# Patient Record
Sex: FEMALE | Race: WHITE | URBAN - METROPOLITAN AREA
[De-identification: names, ages, dates, MRNs, and addresses within clinical notes are randomized per-mention and may not be internally consistent; named-entity substitution may affect disease eponyms.]

---

## 2018-09-13 ENCOUNTER — TELEPHONE (OUTPATIENT)
Dept: BEHAVIORAL HEALTH | Facility: CLINIC | Age: 48
End: 2018-09-13

## 2018-09-13 NOTE — TELEPHONE ENCOUNTER
----- Message from SHEEBA Campo sent at 9/13/2018  2:20 PM CDT -----  Regarding: Set up CD Assessment for cleint at [facility]  Please arrange to set up a CD assessment for the above client at the [facility] location for Thursday, September 20. I believe there are open appointments available.     Thanks    SHEEBA Salinas

## 2018-09-14 ENCOUNTER — TRANSFERRED RECORDS (OUTPATIENT)
Dept: HEALTH INFORMATION MANAGEMENT | Facility: CLINIC | Age: 48
End: 2018-09-14

## 2018-09-14 NOTE — TELEPHONE ENCOUNTER
S-R25 and MARIMAR rec'd from [facility] [employer] in [place].  B-Sent by Mirza Tamayo ()  A-Faxed to [facility] eval team.  - eval is scheduled on 9/27 at 12 pm.

## 2018-09-14 NOTE — TELEPHONE ENCOUNTER
Vincenzo called from [facility] to refer patient for IOP.  Per SD's notes below eval is scheduled for the 9/27th.  Pt is being discharged today or tomorrow.  She will fax over

## 2018-09-14 NOTE — TELEPHONE ENCOUNTER
----- Message from SHEEBA Campo sent at 9/13/2018  3:47 PM CDT -----  Regarding: RE: Set up CD Assessment for cleint at [facility]  Let's go with the 27th at Noon.    Thanks    Tres  ----- Message -----     From: Krys Arizmendi     Sent: 9/13/2018   3:00 PM       To: SHEEBA Campo  Subject: RE: Set up CD Assessment for cleint at [facility]#    Sawyer Joshua,  The next appt at [place] is on 9/27 at noon.   After that, on 10/1 at 10a, 12p, 5p.  Let me know which appt you would like for [facility].  Krys    ----- Message -----     From: Tres Briceno LADC     Sent: 9/13/2018   2:20 PM       To: SHEEBA Hunter, Lo Restrepo, #  Subject: Set up CD Assessment for cleint at [facility]#    Please arrange to set up a CD assessment for the above client at the [facility] location for Thursday, September 20. I believe there are open appointments available.     Thanks    SHEEBA Salinas

## 2018-09-17 NOTE — TELEPHONE ENCOUNTER
Pt called with her fiance Kenan Burciaga and gave verbal permission to exchange info over the recorded line.  His phone is [phone].    They were checking on if they could schedule something sooner. Bit of confusion or miscommunication re: what insurance they had if and scheduling something sooner for her.   They decided to keep the appt. florinda

## 2018-09-19 NOTE — TELEPHONE ENCOUNTER
In speaking with SO, we will keep appointment for client for assessment on 9/27. Client is willing now apparently to entertain thought of OP vs. IP treatment. Asked for copy of CD assessment (or whatever paperwork is available) from [facility] from weekend of 9/14 in preparation for 9/27 appointment.   SHEEBA Salinas

## 2018-09-27 NOTE — TELEPHONE ENCOUNTER
----- Message from SHEEBA Campo sent at 9/25/2018  7:25 PM CDT -----  Regarding: Could I get copy of CD assessment from [facility] for client  Krys: Per the note you entered for the above client in EPIC on 9/13:     S-R25 and MARIMAR rec'd from [employer], [facility] in [place].  B-Sent by Mirza Tamayo ()  A-Faxed to [place] CD eval team.  R-CD eval is scheduled on 9/27 at 12 pm.    Could I please get a copy of the R25 and MARIMAR you got from [facility] [employer]? We can't seem to find it here at the [place] location.  Fax is [phone].    Thanks very much.     SHEEBA Salinas

## 2018-09-27 NOTE — TELEPHONE ENCOUNTER
----- Message from Lo Restreop sent at 9/27/2018 12:45 PM CDT -----  Regarding: cancelling appt   Please cancel appt for eval today at noon.     Pt's significant other called to cancel her appt for an eval today. He called a little before the appt stating at this time they will not be rescheduling.     Thank you!

## 2018-10-15 NOTE — TELEPHONE ENCOUNTER
Received call from pt's friend with pt in the background.  Requested CD eval. Previous eval on 9/27 was cancelled.    10/22 at 10:00am at [facility] sent

## 2018-10-15 NOTE — TELEPHONE ENCOUNTER
Received call from pt'keenan josue.  He reported they received a message from Tres at [place] regarding an open appt at [place] on 10/16.  Cancelled appt on 10/22 and scheduled for 10/16 at [facility].

## 2018-10-16 ENCOUNTER — HOSPITAL ENCOUNTER (OUTPATIENT)
Dept: BEHAVIORAL HEALTH | Facility: CLINIC | Age: 48
Discharge: HOME | End: 2018-10-16
Attending: SOCIAL WORKER | Admitting: SOCIAL WORKER
Payer: COMMERCIAL

## 2018-10-16 PROCEDURE — H0001 ALCOHOL AND/OR DRUG ASSESS: HCPCS

## 2018-10-16 ASSESSMENT — ANXIETY QUESTIONNAIRES
2. NOT BEING ABLE TO STOP OR CONTROL WORRYING: NEARLY EVERY DAY
6. BECOMING EASILY ANNOYED OR IRRITABLE: SEVERAL DAYS
5. BEING SO RESTLESS THAT IT IS HARD TO SIT STILL: NEARLY EVERY DAY
7. FEELING AFRAID AS IF SOMETHING AWFUL MIGHT HAPPEN: NEARLY EVERY DAY
GAD7 TOTAL SCORE: 19
3. WORRYING TOO MUCH ABOUT DIFFERENT THINGS: NEARLY EVERY DAY
1. FEELING NERVOUS, ANXIOUS, OR ON EDGE: NEARLY EVERY DAY
4. TROUBLE RELAXING: NEARLY EVERY DAY

## 2018-10-16 ASSESSMENT — PAIN SCALES - GENERAL: PAINLEVEL: NO PAIN (0)
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
## 2018-10-17 ASSESSMENT — ANXIETY QUESTIONNAIRES: GAD7 TOTAL SCORE: 19

## 2018-10-17 ASSESSMENT — PATIENT HEALTH QUESTIONNAIRE - PHQ9: SUM OF ALL RESPONSES TO PHQ QUESTIONS 1-9: 18
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
## 2018-10-17 NOTE — PROGRESS NOTES
Received copy of Rule 25 completed on 9/14/18 from [employer].  Full Rule 25 was not completed this day, and copy will be scanned in EMR.  Dictation reflects update and current risk and clinical information.    Alcohol / Drug Use Disorder Diagnostic Criteria    A. A problematic pattern of alcohol/drug use leading to clinically significant impairment or distress, as manifested by at least two of the following, occurring within a 12-month period:    1. ___x___  Alcohol/drug is often taken in larger amounts or over a longer period than was intended.    2. __x____  There is a persistent desire or unsuccessful efforts to cut down or control alcohol/drug use.    3. ___x___  A great deal of time is spent in activities necessary to obtain alcohol, use alcohol, or recover from its effects.    4. ___x___  Craving, or a strong desire or urge to use alcohol/drug.    5. __x____  Recurrent alcohol/drug use resulting in a failure to fulfill major role obligations at work, school, or home.    6.  __x____ Continued alcohol use despite having persistent or recurrent social or interpersonal problems caused or exacerbated by the effects of alcohol/drug.    7. __x____  Important social, occupational, or recreational activities are given up or reduced because of alcohol/drug use.    8.  ___x___ Recurrent alcohol/drug use in situations in which it is physically hazardous.    9. ___x___  Alcohol/drug use is continued despite knowledge of having a persistent or recurrent physical or psychological problem that is likely to have been caused or exacerbated by alcohol.    10. Tolerance, as defined by either of the following:    a.  ___x___ A need for markedly increased amounts of alcohol/drug l to achieve intoxication or desired effect.    b.  ___x___ A markedly diminished effect with continued use of the same amount of alcohol/drug .    11. Withdrawal, as manifested by either of the following:    a.  __x____ The characteristic withdrawal  syndrome for alcohol/drug (refer to Criteria A and B of the criteria set for alcohol/drug withdrawal).    b.  __x____ Drug/alcohol (or a closely related substance, such as a benzodiazepine) is taken to relieve or avoid withdrawal symptoms.    Specify if:  In early remission:    After full criteria for alcohol/drug use disorder were previously met, none of the criteria for alcohol/drug use disorder have been met for at least 3 months but for less than 12 months (with the exception that Criterion A4,  Craving, or a strong desire or urge to use alcohol/drug,  may be met).    In sustained remission:  After full criteria for alcohol use disorder were previously met, none of the criteria for alcohol/drug use disorder have been met at any time during a period of 12 months or longer (with the exception that Criterion A4,  Craving, or a strong desire or urge to use alcohol/drug,  may be met).     Specify if:  In a controlled environment:   This additional specifier is used if the individual is in an environment where access to alcohol is restricted.     Moderate:  Presence of 4-5 symptoms.  Severe:  Presence of 6 or more symptoms

## 2018-10-26 ENCOUNTER — APPOINTMENT (OUTPATIENT)
Dept: FAMILY MEDICINE | Facility: CLINIC | Age: 48
End: 2018-10-26

## 2018-10-26 PROCEDURE — 99499 UNLISTED E&M SERVICE: CPT | Performed by: FAMILY MEDICINE

## 2018-11-16 ENCOUNTER — TRANSFERRED RECORDS (OUTPATIENT)
Dept: HEALTH INFORMATION MANAGEMENT | Facility: CLINIC | Age: 48
End: 2018-11-16

## 2018-11-21 ENCOUNTER — TELEPHONE (OUTPATIENT)
Dept: BEHAVIORAL HEALTH | Facility: CLINIC | Age: 48
End: 2018-11-21

## 2018-11-21 NOTE — TELEPHONE ENCOUNTER
----- Message from SHEEBA Campo sent at 11/20/2018  5:42 PM CST -----  Regarding: Set up 1:1 appointment and IOP CD Phase I appointments at [facility]  Please arrange to schedule a 1:1 appointment with me for the above client at the [facility] location on Tuesday, November 27 at 4:30PM. My provider ID is; [id number].     Also please arrange to set up 26 mixed IOP CD Phase I appointments for the above client at the [facility] location beginning Tuesday, November 27 at 5:30PM. [id number].    Thanks     SHEEBA Salinas

## 2018-11-27 ENCOUNTER — HOSPITAL ENCOUNTER (OUTPATIENT)
Dept: BEHAVIORAL HEALTH | Facility: CLINIC | Age: 48
End: 2018-11-27
Attending: SOCIAL WORKER
Payer: COMMERCIAL

## 2018-11-27 PROCEDURE — H2035 A/D TX PROGRAM, PER HOUR: HCPCS

## 2018-11-27 PROCEDURE — H2035 A/D TX PROGRAM, PER HOUR: HCPCS | Mod: HQ

## 2018-11-28 PROBLEM — F19.10 SUBSTANCE ABUSE (H): Status: ACTIVE | Noted: 2018-11-28

## 2018-11-30 NOTE — TELEPHONE ENCOUNTER
----- Message from Crystal Roy, LICSW sent at 11/29/2018  9:16 PM CST -----  Regarding: schedule individual session   Please schedule Stephany for an individual session with Crystal Roy, #[id number], on Monday, 12/03/18, at 4:30 PM.    Thanks,  Crystal

## 2018-12-03 ENCOUNTER — HOSPITAL ENCOUNTER (OUTPATIENT)
Dept: BEHAVIORAL HEALTH | Facility: CLINIC | Age: 48
End: 2018-12-03
Attending: SOCIAL WORKER
Payer: COMMERCIAL

## 2018-12-03 PROCEDURE — H2035 A/D TX PROGRAM, PER HOUR: HCPCS | Mod: HQ

## 2018-12-03 PROCEDURE — H2035 A/D TX PROGRAM, PER HOUR: HCPCS
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
## 2018-12-04 NOTE — PROGRESS NOTES
"Stephany AZAR Mohs  December 3, 2018  [id number]    [facility] Mental Health Process Group Note      Day and Date:  Monday, 12/03/18    Number of participants: 2     Length of Group:  3 hours    Goal of the Group: Clients learn key concepts of traditional CBT (cognitive distortions, conditioning, automatic thoughts, reframing) and then build on these by learning three core concepts of third wave therapies (ACT, DBT, MB): Acceptance, Cognitive Defusion and Being Present. The objective is to increase psychological flexibility and choose behaviors that serve the clients  personal values.      Rating scales are 1-10, with 1 being low and 10 being high or most severe.     [place]: Group Topics and Activities     I.  D3 Intervention and Group Topic addressed: Part 1:  3 Core processes to increase psychological flexibility and increase resiliency for mental and physical health.     II. Mindfulness and/or Motivational Component: Getting Comfortable with Moods, Exploring Affirmations to use this week, Liquid Mood meditation, Awareness of Sounds     III. Additional Activity:  Three Simple Steps to Acceptance; Using Affirmations to increase Self-Acceptance; Cognitive Defusion exercises     IV. Review of Progress:   Client's self-report: This was Stephany's first group with this therapist, but we had met for an individual session before this group, see separate documentation for that session.  She reported having a bad toothache, maybe abscessed, but stuck with the program.  She said her pain was a \"10.\" She said her stress was a 7 earlier this week when she got out of a 32 day treatment and she felt somewhat disoriented and not sure of how to appy the skills she learned at home and at work.  Currently her stress is a 2, but the pain of her tooth makes her feel \"irritable more than anything.\" The color of her liquid-mood changed from red to teal to purple.      A.  Therapist's Assessment: Stephany participated fully in group and stayed the " "whole time even with a bad toothache.  Her favorite self-acceptance affirmation was \"I'm not a bad person when I act badly; I am a person who has acted badly.\"  She appeared very engaged in the discussion of cognitive defusion and how to detach a bit from thoughts and emotions to build distress tolerance.     V.   Reflection and evaluation of today s group experience:  Gave verbal feedback during group and completed the evaluation. Comments included: The most important thing(s) learned today was \"Staying in the present, breathing technique to help bring my thoughts back I start overthinking\" and she was surprised to learn \"The science in how the mind can work and how you can change the way you're thinking.\"      VI. Assignments or activities to do for next week: Complete the journal and practice mindfulness daily. Try to use cognitive defusion throughout the week on negative thoughts and emotions.    Therapeutic techniques in this session:  Motivational Therapy, CBT/DBT/ACT, Supportive and Mindfulness    Additional Treatment Referrals/Follow-up Issues to Address: Not indicated at this time.    Change in Treatment Plan: Not indicated at this time. This group completes one of the Tx Plan interventions under D3.      Change in Diagnosis: No changes at this time.    "

## 2018-12-10 ENCOUNTER — HOSPITAL ENCOUNTER (OUTPATIENT)
Dept: BEHAVIORAL HEALTH | Facility: CLINIC | Age: 48
End: 2018-12-10
Attending: SOCIAL WORKER
Payer: COMMERCIAL

## 2018-12-10 PROCEDURE — H2035 A/D TX PROGRAM, PER HOUR: HCPCS | Mod: HQ

## 2018-12-11 ENCOUNTER — HOSPITAL ENCOUNTER (OUTPATIENT)
Dept: BEHAVIORAL HEALTH | Facility: CLINIC | Age: 48
End: 2018-12-11
Attending: SOCIAL WORKER
Payer: COMMERCIAL

## 2018-12-11 PROCEDURE — H2035 A/D TX PROGRAM, PER HOUR: HCPCS | Mod: HQ

## 2018-12-11 NOTE — PROGRESS NOTES
"Stephany AZAR Mohs  December 10, 2018  [id number]    [facility] Mental Health Process Group Note      Day and Date: Monday, 12/10/18     Number of participants:  4     Length of Group: 3 hours    Goal of the Group: Clients learn key concepts of traditional CBT (cognitive distortions, conditioning, automatic thoughts, reframing) and then build on these by learning three core concepts of third wave therapies (ACT, DBT, MB): Self-as-Context, Values and Committed Action. The objective is to increase psychological flexibility and choose behaviors that serve the clients  personal values.        Rating scales are 1-10, with 1 being low and 10 being high or most severe.     [place]: Group Topics and Activities     I.  D3 Intervention and Group Topic addressed: Mental Health Part 2    II. Mindfulness and/or Motivational Component:  Values and Committed Action, Psychological Flexibility, Excerpts from documentary  I AM  to clarify values and emphasize the importance of community and connection to others.      III. Additional Activity:  Completed examples of  auto-  behavior vs. values based actions, completed the AAQ-II scale; identified personal value to work on this week +  create a  goal that reflects this value + commit to one step toward that goal for this week.       IV. Review of Progress:   A. Client's self-report: Stephany reported that she had a good week and she and her ex-fiance got \"re-engaged.\" She got treatment for her abscessed tooth last week and is feeling better now.  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  He did not do her journal as she \"forgot about it\" but will this week. She did deep breathing this week. Her stress is a 3 and she has had no cravings, just thoughts/memories.      B. Therapist's Assessment:  Stephany shared her experiences " "in recovery with the group. She remains engaged throughout and said she felt uplifted by today's topic. The value Stephany chose to work on this week is \"[employer]\" with regard to \"following through.\"  The goal that reflects this value is \"To find a career path that better fits my current life.  The step toward this goal for this week is \"Get on the Internet and do a job search.\"     V.   Reflection and evaluation of today s group experience:  Gave verbal feedback and filled out evaluation form. The most important thing learned today was \"That my thoughts step from my heart and then my brain\" [refers to documentary] and something that surprised her was \"the way that science and spirituality can work together.\"      VI. Assignments or activities to do for next week: Continue to do the journal and mindfulness, practice cognitive defusion, recognize the \"observing self\" and notice what values are reflected in daily choices and decisions.  Follow through on your STEP 1 from today s value s work.     Therapeutic techniques in this session:  Motivational Therapy, CBT/DBT/ACT, Supportive, Behavioral Modification and Mindfulness     Additional Treatment Referrals/Follow-up Issues to Address: Not indicated at this time.      Change in Treatment Plan: Not indicated at this time. This session completes one Tx Plan intervention under D3.       Change in Diagnosis: Not at this time.        "

## 2018-12-17 ENCOUNTER — HOSPITAL ENCOUNTER (OUTPATIENT)
Dept: BEHAVIORAL HEALTH | Facility: CLINIC | Age: 48
End: 2018-12-17
Attending: SOCIAL WORKER
Payer: COMMERCIAL

## 2018-12-17 PROCEDURE — H2035 A/D TX PROGRAM, PER HOUR: HCPCS | Mod: HQ

## 2018-12-17 PROCEDURE — H2035 A/D TX PROGRAM, PER HOUR: HCPCS
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
## 2018-12-17 NOTE — TELEPHONE ENCOUNTER
----- Message from Crystal Roy LICSW sent at 12/14/2018  7:20 PM CST -----  Regarding: schedule individual session   Please scheduled Stephany for an individual session with Crystal Roy (#[id number]) for Monday, 12/17/18, at 3:30 PM.      Thank you,  Crystal

## 2018-12-18 ENCOUNTER — BEH TREATMENT PLAN (OUTPATIENT)
Dept: BEHAVIORAL HEALTH | Facility: CLINIC | Age: 48
End: 2018-12-18

## 2018-12-18 ENCOUNTER — HOSPITAL ENCOUNTER (OUTPATIENT)
Dept: BEHAVIORAL HEALTH | Facility: CLINIC | Age: 48
End: 2018-12-18
Attending: SOCIAL WORKER
Payer: COMMERCIAL

## 2018-12-18 PROCEDURE — H2035 A/D TX PROGRAM, PER HOUR: HCPCS | Mod: HQ

## 2018-12-18 NOTE — TREATMENT PLAN
[facility]  Adult Chemical Dependency Program  Treatment Plan Requirements    These services are provided by the facility for each patient/client according to the individual's treatment plan:    Individual and group counseling    Education    Transition services    Services to address any co-occurring mental illness    Service coordination    Initial Treatment Plan Goals:  1. Complete all the requirements of Program Orientation.  2. Maintain medication compliance throughout the program.  3. Complete requirements for workshop/skills groups based on identified issues on your problem list.  4. Complete the support group attendance feedback sheet weekly.  5. Gain family involvement in treatment process to address family issues from the problem list.  6. Attend and participate in all required groups per individual treatment plan.  7. Focus attention to individualized issues from the treatment plan.  8. Complete all requirements for UA's, alcohol screening tests and other testing.  9. Schedule a physical examination if recommended.    In addition to the above, complete all individual goals as specifically outlines on your treatment plan.    Criteria for discharge:  Patients/clients are discharged from the program following completion of the entire program including Phase I and II or acceptance of other post-treatment referrals such as [facility] house, or aftercare at other facilities.  Patients/clients may also be discharged for inappropriate behavior or chemical use.      Favorable Discharge - Patients/clients have completed agreed upon treatment goals, understand their diagnosis and appear motivated about the follow-up care.    Guarded Discharge - Patients/clients have demonstrated some understanding of their diagnosis and recovery process, and have completed some of their treatment goals.  This prognosis also includes patients/clients who have completed some treatment goals but have not made  commitment to community support or follow through with referrals.    Unfavorable Discharge - Patients/clients have not completed agreed upon treatment goals due to their own choice, have limited understanding of their diagnosis, and have shown minimal or inconsistent behavior conducive to recovery.  Those patients/clients discharged due to behavioral problems will also be unfavorable discharges.    Adult CD Treatment Plan                                Stephany AZAR Mohs   [id number]   1970 48 year old female    Acute Intoxication/Withdrawal Potential     DIMENSION 1  RISK FACTOR: 0     SUBSTANCE USE DISORDERS:  Alcohol            Date Assigned Source Area of Treatment Focus / Goal / Treatment Strategies    Target  Date Initials Outcome Date Completed   December 18, 2018   Self -  Current, History -  Current and Collateral -  Current  Area of Treatment Focus:   Substance use, cravings and urges.  Last use date was reported as 10/22/2018.       Goal:   Develop effective strategies to maintain sobriety.      Treatment Strategies:   Report to counselor and group any alcohol or drug use. April 30, 2019 KRN Effective and Refused     Sober date reported Client drank 3/24, no call no show appointments in April.     Client passed several BA's in CD tx.  May 20, 2019       Biomedical Conditions and Complaints     DIMENSION 2  RISK FACTOR: 1             Date Assigned Source Area of Treatment Focus / Goal / Treatment Strategies Target  Date Initials Outcome Date Completed   December 18, 2018   Self -  Current, History -  Current and Collateral -  Current  Area of Treatment Focus:  Client reports history of hypertension and thyroid issues.  From EMR:     Goal:   Follow recommendations of medical provider.    Treatment Strategies:  D Report date of scheduled appointment to counselor, Report change in severity of symptoms to staff.  and Continue to take prescribed medications and follow-up with medical interventions while in  program. Report regularly on any new concerns regarding physical health and recommended interventions.  April 30, 2019 NABIL Effective     [facility] drug overdose stats 12/7/      May 20, 2019       December 18, 2018   Self -  Current and Assessment -  Current  Problem: Lack of regular program of physical activity.   Goal: Establish regular exercise program while in [facility] CD treatment.   Intervention: Report during regular check in regarding regular physical activity.  1/15/2019 SONYAN Effective and Refused     No updates.  May 20, 2019       Emotional/Behavioral/Cognitive Conditions and Complications     DIMENSION 3  RISK FACTOR: 2             Date Assigned Source Area of Treatment Focus / Goal / Treatment Strategies Target  Date Initials Outcome Date Completed     December 18, 2018   Self -  Current and Assessment -  Current  Area of Treatment Focus:   Diagnosis of anxiety. .  Client reports low self esteem.     Goal:   Improve self-esteem. and Understand the relationship between addiction and mental health issues.    Treatment Strategies:    Identify 5 coping skills to reduce anxiety.  Practice techniques daily and when needed., List 5 ways your addiction has impacted your mental health.  and establish regular 1:1 appointments with [facility] psychotherapist to address emotionale needs, regulation and coping skills.  Report during weekly check in the effectiveness or challenges to effective interventions.     1. Attend psychotherapy session with Crystal Roy to explore needs and assess safety.     2. Complete a diagnostic assessment with Crystal Roy.     3. First individual psychotherapy session. Allision wants to work on the following issues:   How to help my mind not be so compulsive.   We are also to explore her fear of failure limiting her behavior and ability to explore options.      4. Met with Crystal for individual therapy session: Acceptance, stress relief, cognitive distortions, explore automatic negative thoughts she  identified.     5. Met with Crystal for individual therapy session to review progress, revisit her mindfulness and journal practice, problem solve, discuss relationships.    6. Met with Crystal for individual therapy, processed through recent grief and loss. Reported use episode after death of close friend.     7. Met with Crystal for psychotherapy, rated anxiety at 8, working on decreasing anxiety by organization skills to break procrastination, reviewed mindfulness and problem-solving.     8. Met with Crystal for psychotherapy: PHQ-9 = 8 (mild depression);   HAFSA-7 = 10 (mild-moderate anxiety) Session focused on ruminating thoughts, cognitive defusion and bringing self back to present.     9. Allison called in for this appointment and was excused.  1/15/2019 NABIL LAMA      SK    SK           SK       SK      DAINA LAMA        SK   Effective                               Effective      Effective    Effective           Effective      Effective      Effective        Effective        Effective         [Excused]                                12/03/18 12/17/18 01/21/19 02/11/19 02/25/19 03/25/19 04/08/19 04/22/19        [05/06/19]      December 18, 2018   Self -  Current and Assessment -  Current  Area of Treatment Focus:  Client lacks understanding of addiction as a disease and how this disorder affects other aspects of mental and physical health.    Goal:   Learn how to apply self-care and psychotherapy to build a repertoire of daily habits that counteract PAWS, fatigue, depression and anxiety leading to increased resiliency and well-being.     Treatment Strategies:  Attend the following 3-hour groups:    > Neurobiology of addiction: Informs client of brain changes and reduces feelings of shame, instructs on steps to help heal    > Learn and use Mindfulness to facilitate effective action in problem solving and promote health and well-being     >  "Mental Health Part 1: Learn 3 core processes of Acceptance,   Cognitive De-fusion, and Being Present    > Mental Health Part 2: Learn 3 core processes of Self-as-Context, Values, and Committed Action     > Learn and apply Self-Care in a variety of areas to improve mental and physical health, reduce PAWS, and increase feelings of self-empowerment, resiliency and well-being    > Learn Stress Management techniques to reduce PAWS and stress-related symptoms, increase resiliency, and learn healthy routines to replace dysfunctional habits      [Inserted Holiday Group on 12/31/18 due to this being a \"trigger\" day for people with addiction] Due: 2/19/19  Attend each group one time by above date, unless excused by primary counselor.    All group work to be completed each session to receive an effective outcome.  DAINA LAMA Effective                           Effective      Effective       Effective      Effective      Effective        [Excused Holiday]  Effective       [Excused]                           01/14/19      01/07/19       12/03/18      12/10/18      12/17/18        [12/24/18]    02/04/19       [12/31/18]     Readiness to Change     DIMENSION 4  RISK FACTOR: 1             Date Assigned Source Area of Treatment Focus / Goal / Treatment Strategies Target  Date Initials Outcome Date Completed   December 18, 2018   Self -  Current and Assessment -  Current  Area of Treatment Focus:  {Move up stages of change in recovery. Increase internal motivation for recovery/sobriety. Client reports her alcohol use affected relationships, finances and her work.     Goal:   Understand the impact your substance use has had on you., Understand the impact your substance use has had on your family and significant relationships. and Increase internal motivation.    Treatment Strategies:   Present using history, consequences of use and 5 values violated. and " "Obtain and prepare homework regarding motivation for change and reducing ambivalence about substance use disorder. Move up stages of change from contemplation to at least preparation. Report on values violated and actions taken to correct that situation while presenting HW assignments as given in group. Participate and offer feedback regularly in group work designed to enhance relationship building and internal motivation for recovery/sobriety.  3/1/2019 SONYAN Changed     \"One or two things I find easier/harder in sobriety/recovery than I thought\" 11/27    \"Alternatives to addictive lifestyle\" HW 1/8    \"First 90 days in recovery\" game plan 1/22 May 20, 2019          Relapse/Continues Use/Continues Problem Potential     DIMENSION 5  RISK FACTOR: 4                 Date Assigned Source Area of Treatment Focus / Goal / Treatment Strategies Target  Date Initials Outcome Date Completed   December 18, 2018   Self -  Current and Collateral -  Current  Area of Treatment Focus:   Lacks a daily routine that includes healthy and sober living skills.       Goal:   Develop sober coping and living skills.    Treatment Strategies:   Develop a relapse prevention plan including lifestyle changes, recovery activities, daily structure, self-care, support systems, spirituality, work, finances, recreation and crisis management. Prepare and present Recovery Care assignment in [facility] Phase III.  Practice and report on Recovery Program skill implementation and outcomes on regular basis. Adjust as necessary.  4/1/2019 SONYAN Refused     Stages of recovery 12/7    Did not complete RCA, did not attend sober support while in [facility] tx.  May 20, 2019       December 18, 2018   Self -  Current and History -  Current  Problem: History of relapse and self-sabotage.  and Client/Patient does not recognize relapse triggers and warning signs.     Goal: Identify personal triggers and relapse warning signs., Identify what led to your last relapse. and " "Identify and understand personal relapse and self-sabotage patterns.  Intervention: Complete the triggers and cravings assignment and include alternative behaviors., Describe your last relapse and what led to it including feelings and situations. and Identify and begin attending sober support groups. Participate in at least two relapse cycle overviews/group discussions while in IOP CD tx. Attend and report back on regular basis outcomes from attending sober support groups. Solicit feedback on regular basis from peers regarding barriers to attendance in sober support.  2/1/2019 NABIL Effective     AA overview/Q&A with AA member  12/11     \"Anish to Self\" video/discussion - science of safety applied to relapse. 12/18 May 20, 2019       Recovery Environment     DIMENSION 6  RISK FACTOR: 3                 Date Assigned Source Area of Treatment Focus / Goal / Treatment Strategies Target  Date Initials Outcome Date Completed   December 18, 2018   Self -  Current and Assessment -  Current  Area of Treatment Focus:   Lacks sober support network.    and Client/Patient lacks sober leisure activities.       Goal:   Develop a sober support network., Develop boundaries. and Reflect on treatment experience, celebrate accomplishments, anticipate challenges in new sober lifesytle and set goals.    Treatment Strategies:   Complete the personal \"Recovery Care Assignment\" and List 10 situations, people, emotional responses that are unhealthy.  Develop a plan to avoid or manage them.  Report regularly on people, places and things that are continuing to be challenges in avoiding trigger situations and what questions you have about those situations/triggers/cravings.  4/15/2019 NABIL Refused     Sober support group overview - 12 step and non 12 step (1/15).     \"Sobriety vs. Recovery\" dry drunk syndrome 2/12 May 20, 2019       December 18, 2018   Self -  Current and Assessment -  Current  Problem: Client reports need to increase income, " perhaps leading to job and/or career change.   Goal: Seek out employment that meets financial, family and JASPAL recovery needs.   Intervention: Report on status of career/job change during weekly check in. Report weekly on status of interview/job offers, etc. Work with counselor to adjust attendance in [facility] CD tx. As agreed upon in orientation session.  1/1/2019 KRN Effective     Client has obtained new job while in [facility] Phase I and Phase II programming.     Attendance has been adjusted with understaning that client would remain sober, remain employed, attend all Psychoeducation sessions and 1:1 therapist appointments regularly.  May 20, 2019         Individual abuse prevention plan (required for lodging plus) : specific actions, referral:   No additional protection measures required other than the Program Abuse Prevention Plan - No     All interventions that are designated as  current  will need to be completed in order to transition out of treatment with a favorable prognosis.  The treatment plan is a flexible document and a work in progress.  Interventions and goals may be added at any time to customize plan to each individual s needs.  Client may work with therapist to change interventions as long as they pertain to the goals stipulated in the plan and/or are clinically driven.     Acknowledgement of Current Treatment Plan - Initial Treatment Plan     INITIAL TREATMENT PLAN:     1. I have participated in creating my treatment plan with my therapist / counselor on March 25, 2019  , 2018  .     I agree with the plan as it is written in the electronic health record.    Name Signature/Date   Mohs, Allison G.    Name of Therapist / Counselor Signature/Date   SHEEBA Salinas              2. I have completed and reviewed my Safety Plan with my counselor and signed this on _December 18, 2018  _. I have been given the hard copy of this plan.    Patient signature/date:       _____________________________________________________________________________            3. Last Use Date: __________    Patient signature/date:     _____________________________________________________________________________

## 2018-12-18 NOTE — PROGRESS NOTES
Stephany came for her Diagnostic Assessment (DA) from 3:35PM - 5:05 PM, please see document for details.

## 2018-12-18 NOTE — TREATMENT PLAN
Patient Safety Plan Template    Name:   Allison G Mohs YOB: 1970 Age:  48 year old MR Number:  [id number]   Step 1: Warning signs (Thoughts, images, mood, situation, behavior) that a crisis may be developin. {Wildcard & [id number]}     2. {Wildcard & [id number]}     3. {Wildcard & NA:942314}     Step 2: Internal coping strategies - Things I can do to take my mind off of my problems without contacting another person (relaxation technique, physical activity):     1. {Wildcard & NA:044267}     2. {Wildcard & NA:478799}     3. {Wildcard & NA:465784}     Step 3: People and social settings that provide distraction:     1. Name: {Wildcard & NA:413489}   Phone: {Wildcard & NA:077802}   2. Name: {Wildcard & NA:731839}   Phone: {Wildcard & NA:326704}   3. Place: {Wildcard & NA:328010}   4. Place: {Wildcard & NA:418929}     The one thing that is most important to me and worth living for is: ***     Step 4: People whom I can ask for help:     1. Name: {Wildcard & NA:104124}   Phone: {Wildcard & NA:273849}     2. Name: {Wildcard & NA:703350}   Phone: {Wildcard & NA:431822}     3. Name: {Wildcard & NA:524198}   Phone: {Wildcard & NA:518459}     Step 5: Professionals or agencies I can contact during a crisis:     1. Clinician Name: {Wildcard & NA:095325}   Phone: {Wildcard & NA:793011}   Clinician Pager or Emergency Contact #: {Wildcard & NA:589572}     2. Clinician Name: {Wildcard & NA:147555}   Phone: {Wildcard & NA:994864}     Clinician Pager or Emergency Contact #: {Wildcard & NA:488336}     3. Local Urgent Care Services: {Wildcard & NA:743734}    Urgent Care Services Address: {Wildcard & NA:854046}    Urgent Care Services Phone: {Wildcard & NA:893666}     4. Suicide Prevention Lifeline Phone: 5-291-544-PSPN ([phone])     Step 6: Making the environment safe:     1. {Wildcard & NA:352997}     2. {Wildcard & NA:767064}     Safety Plan Template 2008 Laurence Chance and Stanford Campos is reprinted with the  express permission of the authors.  No portion of the Safety Plan Template may be reproduced without the express, written permission.  You can contact the authors at [email or web address] or [email or web address].

## 2018-12-18 NOTE — PROGRESS NOTES
Comprehensive Assessment Summary     Based on client interview, review of previous assessments and   comprehensive assessment interview the following diagnosis and recommendations are:     Patient: Allison G Mohs  MRN; [id number]   : 1970  Age: 48 year old Sex: female     Client meets criteria for:  303.90 Alcohol Dependence    Dimension One: Acute Intoxication/Withdrawal Potential     Ratin  (Consider the client's ability to cope with withdrawal symptoms and current state of intoxication)   No concerns, client started IOP CD tx. After discharge from women's inpatient CD program. MARY date reported 10/22/2018.     Dimension Two: Biomedical Condition and Complications    Ratin  (Consider the degree to which any physical disorder would interfere with treatment for substance abuse, and the client's ability to tolerate any related discomfort; determine the impact of continued chemical use on the unborn child if the client is pregnant)   Client reports macular degeneration and hypothyroidism and hypertension.  No health issues that would interfere with treatment. Client has regular medical provider.     Dimension Three: Emotional/Behavioral/Cognitive Conditions & Complications  Ratin  (Determine the degree to which any condition or complications are likely to interfere with treatment for substance abuse or with functioning in significant life areas and the likelihood of risk of harm to self or others)   Client reports depression and anxiety, but expresses most concern with anxiety.  She is prescribed medication.She does have a history of abuse and lacks coping skills for stressors.  She denies any suicidal ideation.     Dimension Four: Treatment Acceptance/Resistance     Ratin  (Consider the amount of support and encouragement necessary to keep the client involved in treatment)   Client expresses a need for treatment aftercare per [facility] discharge instructions and is open to  recommendations and willing to follow through.     Dimension Five: Continued Use/Relaspe Prevention     Ratin  (Consider the degree to which the client's recognizes relapse issues and has the skills to prevent relapse of either substance use or mental health problems)   Client has not been able to establish and maintain abstinence despite detox services prior to admit to  CD tx.  She lacked daily sober living skills and is beginning to acquire and put in practice relapse prevention skills. .     Dimension Six: Recovery Environment     Rating:   3  (Consider the degree to which key areas of the client's life are supportive of or antagonistic to treatment participation and recovery)   The client reports she is not working fulltime, but part-time and is self-employed.  She has 1 daughter she has 50/50 custody of and denies any CPS involvement.  She is in a relationship, which is strained due to her alcohol use.  She has a history of 3 DWIs, but denies any current legal or probation.      I have reviewed the information on the assessment, psychosocial and medical history and checklist:        it is current

## 2018-12-18 NOTE — PROGRESS NOTES
"Stephany AZAR Mohs  December 17, 2018  [id number]    [facility] Mental Health Process Group Note    Day and Date and Time of Service:  Monday, 12/17/18      Number of participants:  5      Length of Group:  3 hours      Goal of the Group: Learn aspects of self-care to increase self-empowerment and reduce the impact of post-acute withdrawal symptoms. Learn how self-care can lead to clearer thinking and increased physical and mental resiliency.      [place]: Group Topics and Activities      I.  D3 Intervention and Group Topic addressed: Self-Care and Self-Compassion; Self-Care quiz and goals for next week     II. Mindfulness and/or Motivational Component: Self-Care Evaluation, Worksheet to prioritize needs, Laughter as Self-Care     III. Additional Activity: Completed a self-care evaluation and then prioritized what they felt was lacking and identified what aspect of self-care to address first. Then, developed steps to start this week to improve this area and be more proactive in their own healthcare routine.                      IV. Review of Progress:   A. Client s self-report:  Stephany completed her DA with this therapist prior to this group session. In group she said she had \"a pretty good week\" and all her dental work is now done.  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  She continues to journal and do mindfulness, her stress is a 2 and she reported no cravings this week.      B. Therapist s Assessment:   Stephany participates fully in group discussion and appears to enjoy the process.   The self-care area to address this week:   Nutrition  The first step to work on this week:  \"Eating less candy\"             V.   Reflection and evaluation of today s group experience:  Gave verbal feedback and filled out evaluation form. " "The most important thing learned today was \"How much our eating and exercise habits affect our way of thinking\" [moods] and she wrote that she was surprised that \"laughter is more important in our lives than I thought it was, mentally and physically.\"       VI. Assignments or activities to do for next week: Continue to complete the daily journal before bed, do at least one mindfulness exercise a day, practice cognitive defusion for negative thoughts and emotions.   Follow the steps s/he created regarding the self-care identified as most urgent to address.       Therapeutic techniques in this session:  Motivational Therapy, Supportive, Behavioral Modification and Mindfulness      Additional Treatment Referrals/Follow-up Issues to Address: Follow-up is not indicated at this time.       Change Treatment Plan:  No, however, this group does fulfill one intervention under D3.       Change Diagnosis: No changes this week.    "

## 2019-01-07 ENCOUNTER — HOSPITAL ENCOUNTER (OUTPATIENT)
Dept: BEHAVIORAL HEALTH | Facility: CLINIC | Age: 49
End: 2019-01-07
Attending: SOCIAL WORKER
Payer: COMMERCIAL

## 2019-01-07 PROCEDURE — H2035 A/D TX PROGRAM, PER HOUR: HCPCS | Mod: HQ

## 2019-01-08 ENCOUNTER — HOSPITAL ENCOUNTER (OUTPATIENT)
Dept: BEHAVIORAL HEALTH | Facility: CLINIC | Age: 49
End: 2019-01-08
Attending: SOCIAL WORKER
Payer: COMMERCIAL

## 2019-01-08 PROCEDURE — H2035 A/D TX PROGRAM, PER HOUR: HCPCS | Mod: HQ

## 2019-01-08 NOTE — PROGRESS NOTES
Stephany AZAR Mohs  January 7, 2019  [id number]    [facility] Mental Health Process Group Note    Day and Date:  Monday, 01/07/19     Number of participants:  3      Length of Group: 3 hours      Goal of the Group: Learn mindfulness exercises to help clients focus on the present and increase their awareness of thoughts and emotions.  This process helps clients detach from unhelpful thinking patterns and be less affected by negative emotions. This practice has been shown to decrease reactivity and help facilitate effective action to work on changes in the present and thereby create a brighter, healthier future. This practice has also been shown to increase resiliency if practiced regularly.       [place]: Group Topics and Activities      I.  D3 Intervention and Group Topic addressed: Mindfulness to facilitate effective action     II. Mindfulness and/or Motivational Component: Mindful Eating, Mindful Listening Peer Interview, Awareness of Sounds Meditation, Sitting Meditation      III. Additional Activity:  Mindful Eating, Mindful Listening peer interviews, Craig exercise       IV. Review of Progress:   A. Client self-report:  Stephany reported that she and her fiance ended up not attending the [employer] event on PRITI as she was concerned she wasn't ready to go to an event that had drinking as part of the celebration. She was praised for this decision. She said this was a last minute decision which is why she didn't attend group that night.  She forgot to do her journal and will start this week. She said she is doing mindfulness 2-3 times week. Her stress is a 3 and this is mainly due to financial issues and bills that are due. She said she is used to working 2 jobs to be independent raising her daughter but now that she is engaged she is accepting some assistance from her fiance and this is hard for her to accept.  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   Mindful Eating: Stephany stated  "that she noticed that she usually eats really fast so that the strawberry would register as cold more than anything else. Today she tasted how good it was.      B. Therapist Assessment: Stephany participates fully in group and shares openly about her history. She shared with a member that she, too, has received 3 DWIs in 10 years and it still is hard for her to understand how this happened as \"I never drove, I never drank and drove anywhere, one time a mile from home and this is what happened.\" She also said it proves that she shouldn't drink, she can't trust herself to make the right decisions when intoxicated.   The [place] [employer] [place] exercise issue for mindfulness practice centered on \"Money, wanting to be financially stable.\"  The comments  before the mindfulness included \"Do I need to change careers, get a second job? My fiance said that he is helping but I'm not used to this.\"   The comments  after exercises included \"I'd like to get a gym membership and take a vacation\" and she explained, \"If I am financially stable, I can get past the stressful part of it with bills and use it for pleasure.\"  We spoke of ABIMBOLA Barone's saying, \"Money is a terrible master but an excellent servant.\"   Stephany made an appt with me for an individual therapy session to address her anxiety. At first Stephany was anxious that she \"coudn't stop thinking\" during the sounds exercise. When it was explained to her that noticing she is thinking is the important part, she was relieved and reported being calm in the rest of the exercises.      V.   Reflection and evaluation of today s group experience:  Gave verbal feedback and filled out evaluation form. Client wrote that the most important thing learned was \"Mindfulness with eating and really knowing how to savor things\" and she wrote one concern as \"Learning more on how to stay in the the present\" and she was surprised by \"It'n not a bad thing to over-think. Just bring your mind and thought hack to " "the present.\"       VI. Assignments or activities to do for next week: Mindfulness in the day and journal before bed. Continue to monitor stress daily and use skills to manage.      Therapeutic techniques in this session:  Motivational Therapy, CBT/DBT/ACT, Supportive, Behavioral Modification and Mindfulness     Additional Treatment Referrals/Follow-up Issues to Address: Not indicated at this time      Change in Treatment Plan: No changes are indicated at this time. This group does complete one Treatment Plan intervention under D3.       Change in Diagnosis: No Changes at this time.             "

## 2019-01-11 NOTE — TELEPHONE ENCOUNTER
----- Message from SHEEBA Campo sent at 1/10/2019  6:25 PM CST -----  Regarding: Add mixed IOP CD Phase I appointments for client at [facility]  Please arrange to add 12 additional mixed IOP CD Phase I appointments for the above client at the [facility] location beginning Monday, January 14 at 5:30PM. [id number].    Thanks    SHEEBA Salinas

## 2019-01-11 NOTE — TELEPHONE ENCOUNTER
----- Message from SHEEBA Campo sent at 1/10/2019  6:25 PM CST -----  Regarding: Add mixed IOP CD Phase I appointments for client at [facility]  Please arrange to add 12 additional mixed IOP CD Phase I appointments for the above client at the [facility] location beginning Monday, January 14 at 5:30PM. [id number].    Thanks    SHEEBA Salinas

## 2019-01-14 ENCOUNTER — HOSPITAL ENCOUNTER (OUTPATIENT)
Dept: BEHAVIORAL HEALTH | Facility: CLINIC | Age: 49
End: 2019-01-14
Attending: SOCIAL WORKER
Payer: COMMERCIAL

## 2019-01-14 PROCEDURE — H2035 A/D TX PROGRAM, PER HOUR: HCPCS | Mod: HQ

## 2019-01-15 ENCOUNTER — HOSPITAL ENCOUNTER (OUTPATIENT)
Dept: BEHAVIORAL HEALTH | Facility: CLINIC | Age: 49
End: 2019-01-15
Attending: SOCIAL WORKER
Payer: COMMERCIAL

## 2019-01-15 PROCEDURE — H2035 A/D TX PROGRAM, PER HOUR: HCPCS | Mod: HQ

## 2019-01-15 NOTE — PROGRESS NOTES
"Stephany AZAR Mohs  January 14, 2019  [id number]    [facility] Mental Health Process Group Note      Day and Date:  Monday, 1/14/19         Number of participants:  4      Length of Group: 3 hours     Goal of the Group: Learn current neuroscience of addiction to better understand addiction-as-a-disease and to reduce confusion, shame and guilt leading to increased open-mindedness and psychological flexibility needed to build resilience. Clients learn what is meant by  retrain the brain.        [place]: Group Topics and Activities      I.  D3 Intervention and Group Topic addressed: Neuroscience and the importance of treatment     II. Mindfulness and/or Motivational Component:  Stone Lee 18-minute talk on the power of addiction and the value of compassion.    III. Additional Activity: Group learned the different roles of various areas of the brain and how these are changed by addiction.  They learned how these changes manifest in behaviors and lead to consequences. They learned how to strengthen the frontal cortex to reduce the impact of cravings and  using  thoughts emanating from the midbrain.  Group compared different areas of addiction that society may condone and others that still have stigma attached to them.           IV. Review of Progress:   A. Client self-report:  Stephany stated her week was 'sad' due to losing a friend who had been very important to her when in residential care. She reported having a lot of using dreams and this is very stressful to her.  She is averaging about one of these dreams a night. She said her stress was a 7 \"because I'm exhausted from having these dreams.\"  She reported no cravings but said she is a little irritable lately and feels her fiance does not pitch in around the house like she'd like him to. She has not told him this though and we spoke of how distress tolerance may decrease during PAWS.  She is doing some mindfulness by being in the present when she is driving.          B. " "Therapist Assessment:  Karson participated fully in the group discussion and has relevant questions and comments to share with the other members.      V.   Reflection and evaluation of today s group experience:  Gave verbal feedback and filled out evaluation form. Client wrote the most important thing(s) learned today that included  Addiction is a brain disease\" and \"I am interested in learning more about addiction.\" She especially liked the Mate segment.     VI. Assignments or activities to do for next week: Continue to do journal before bed, do at least one mindfulness exercise in the day time, address one self-care area a week, incorporate one stress reduction technique into daily schedule of structure and routine.     Therapeutic techniques in this session:  Motivational Therapy, CBT/DBT/ACT, Supportive, Behavioral Modification and Mindfulness      Additional Treatment Referrals/Follow-up Issues to Address: Not needed at this time.      Change in Treatment Plan: No change. This session completes one Treatment Plan intervention in D3.     Change in Diagnosis: No change in diagnosis indicated.    "

## 2019-01-17 NOTE — TELEPHONE ENCOUNTER
----- Message from SHEEBA Campo sent at 1/17/2019  2:53 PM CST -----  Regarding: Add mixed IOP CD Phase I appointments for client at [facility]  Please arrange to add 12 mixed IOP CD Phase I appointments for the above client at the [facility] location beginning Monday, January 21 at 5:30PM. [id number].  Thanks  SHEEBA Salinas

## 2019-01-21 ENCOUNTER — HOSPITAL ENCOUNTER (OUTPATIENT)
Dept: BEHAVIORAL HEALTH | Facility: CLINIC | Age: 49
End: 2019-01-21
Attending: SOCIAL WORKER
Payer: COMMERCIAL

## 2019-01-22 ENCOUNTER — HOSPITAL ENCOUNTER (OUTPATIENT)
Dept: BEHAVIORAL HEALTH | Facility: CLINIC | Age: 49
End: 2019-01-22
Attending: SOCIAL WORKER
Payer: COMMERCIAL

## 2019-01-22 ENCOUNTER — TELEPHONE (OUTPATIENT)
Dept: BEHAVIORAL HEALTH | Facility: CLINIC | Age: 49
End: 2019-01-22

## 2019-01-22 PROCEDURE — H2035 A/D TX PROGRAM, PER HOUR: HCPCS | Mod: HQ

## 2019-01-22 NOTE — TELEPHONE ENCOUNTER
----- Message from LENNY MoraSW sent at 1/21/2019  5:20 PM CST -----  Regarding: schedule individual session   Please schedule an individual session with Crystal Roy (#[id number]) on Monday, 1/21/19, at 4:00 PM. This session is over now, but it was requested at last minute in lieu of her IOP group.     Thank you!  Crystal

## 2019-01-22 NOTE — TELEPHONE ENCOUNTER
----- Message from SHEEBA Campo sent at 1/22/2019  2:02 PM CST -----  Regarding: Add 12 mixed IOP CD Phase I appointments for client at [facility] location  Please arrange to add 12 mixed IOP CD Phase I appointments for the above client at the [facility] location beginning Tuesday, January 22 at 5:30PM. [id number].  Thanks  SHEEBA Salinas

## 2019-02-04 ENCOUNTER — HOSPITAL ENCOUNTER (OUTPATIENT)
Dept: BEHAVIORAL HEALTH | Facility: CLINIC | Age: 49
End: 2019-02-04
Attending: SOCIAL WORKER
Payer: COMMERCIAL

## 2019-02-04 PROCEDURE — H2035 A/D TX PROGRAM, PER HOUR: HCPCS | Mod: HQ

## 2019-02-05 NOTE — PROGRESS NOTES
"Stephany AZAR Mohs  February 4, 2019  [id number]    [facility] Mental Health Process Group Note      Day and Date: Monday, 2/04/19         Number of participants: 4      Length of Group: 3 hours       Goal of the Group: Learn the importance of Stress Management and techniques to reduce PAWS and stress-related symptoms, increase resiliency, and learn healthy routines to replace dysfunctional habits       [place]: Group Topics and Activities      I.  D3 Intervention and Group Topic addressed: Stress management, productivity, balance and procrastination     II. Mindfulness and/or Motivational Component: Group completed progressive relaxation and deep breathing techniques and identified skills to work on this week. Group also did the mindfulness exercise Awareness of Sounds       III. Additional Activity: Clients identified personal stressors and if/how addiction intensified their stress. They completed the [place] exercise that involves the mindfulness work.  The group also learned tips to overcome procrastination and the benefits of  de-cluttering  for mental health, and the importance of humor in self-care.      IV. Review of Progress:   A. Client s self-report: Stephany reported that her interview the \"[employer]\" went well and she was offered the job but she is worried about the background check that was not completed yet. She has had 3 DWIs and her last one was a gross misdemeanor. She continues to do her journal, \"but not every night\" and does a \"a lot of mindfulness, it makes more sense to me now.\"  Her stress is a 3 and she said the background check feels \"surreal\" and \"I have done everything on my end I can do\" and this is part of her acceptance.       B. Therapist s assessment:   Stephany participates fully in the group process and applies what she is learning.      V.   Reflection and evaluation of today s group experience:  Gave verbal feedback and filled out evaluation form. Client wrote the most important thing learned " "today was \"Ways to relieve stress\" and \"better ways of handling stress.\"      VI. Assignments or activities to do for next week: Continue to do journal before bed, do at least one mindfulness exercise in the day time, address one self-care area a week, introduce a stress reduction technique into her daily schedule.      Therapeutic techniques in this session:  Motivational Therapy, CBT/DBT/ACT, Supportive, Behavioral Modification and Mindfulness      Additional Treatment Referrals/Follow-up Issues to Address: Not needed at this time.      Change in Treatment Plan: No change, this group completes one intervention in Treatment Plan under Dimension 3.        Change in Diagnosis: No change in diagnosis indicated.   "

## 2019-02-11 ENCOUNTER — HOSPITAL ENCOUNTER (OUTPATIENT)
Dept: BEHAVIORAL HEALTH | Facility: CLINIC | Age: 49
End: 2019-02-11
Attending: SOCIAL WORKER
Payer: COMMERCIAL

## 2019-02-11 PROCEDURE — H2035 A/D TX PROGRAM, PER HOUR: HCPCS | Mod: HQ | Performed by: SOCIAL WORKER

## 2019-02-11 PROCEDURE — H2035 A/D TX PROGRAM, PER HOUR: HCPCS | Performed by: SOCIAL WORKER

## 2019-02-11 NOTE — TELEPHONE ENCOUNTER
----- Message from Crystal Roy, LICSW sent at 2/11/2019 10:59 AM CST -----  Regarding: Schedule individual session   Please schedule Stephany for an individual session with Crystal Roy, #[id number], today, Monday, 2/11/19, at 4:00 PM    Thank you,  Crystal

## 2019-02-11 NOTE — PROGRESS NOTES
"Progress Note for Individual Session     D:  Ashlyn met from 4:00 PM - 5:10 PM. She said she got the job with [employer] and was very excited. She will keep her other job open part-time as well in case things don't work as well as she hopes.  She was especially proud of having a job that had benefits and PTO. She is doing deep breathing and feels this has calmed and her stress \"is really low this week.\"  We processed through that she called \"all or nothing thinking,\" which in her context resembled compulsive behavior. She identified her insecurities and doubts and that she self-sabotages relationships. Stephany was able to articulate some of the concerns she has in her current relationship. We discussed how addiction can make one feel \"betrayed by oneself\" and this may be projected to others and temporarily change how we view relationships, make us feel vulnerable, even suspicious. She also spoke of her history of being passive when she would like to have more say in a decision. If she has doubts and feels unheard, she gives in and then resents it when her \"way of doing it\" may have been better.      I:   This session was an individual session that included supportive and motivational therapy. Stephany and SREE processed through negative emotions, problem solved and Stephany also demonstrated her ability to use cognitive defusion appropriately.      A:   Stephany continues to be committed to her recovery in [facility] and mental health aspects leading to anxiety and obsessive behavior. She is applying what she is learning and is open to the therapeutic process and has shown remarkable honesty and courage in her comments. Toward the end of the session she said, \"I now know that I don't need to move to a new home, it's not about that, it's me I need to fix.\" She had a brighter affect today and was able to smile and see  humor in some of these issues that she had not expressed before.      P:   Stephany was given homework " consisting of learning about cognitive distortions to identify patterns she may see in her own thinking. He next appt is scheduled for Monday, 2/25/19, at 4:00 PM and we will review this work and begin to identify more automatic negative thoughts.

## 2019-02-12 ENCOUNTER — HOSPITAL ENCOUNTER (OUTPATIENT)
Dept: BEHAVIORAL HEALTH | Facility: CLINIC | Age: 49
End: 2019-02-12
Attending: SOCIAL WORKER
Payer: COMMERCIAL

## 2019-02-12 PROCEDURE — H2035 A/D TX PROGRAM, PER HOUR: HCPCS | Mod: HQ

## 2019-02-13 NOTE — PROGRESS NOTES
CD ADULT Progress Note     Treatment Plan Review completed on: Scheduled 2/13    Attendance Dates: 2/11, 2/12    Total # of Group Sessions:  2    Length of stay/projected discharge date: April 20, 2019 MONDAY TUESDAY WEDNESDAY THURSDAY FRIDAY SATURDAY SUNDAY Total   Group Therapy 2 2         Specialty Groups*           1:1           Family Program           [place]             Phase II             Absent           Total             *Specialty Groups include Mental Health Care, Assertiveness and Communication, Sobriety Maintenance Skills, Spiritual Care, Stress Management, Relapse Prevention, Family Systems.                  Learning Style:  Demonstration    Staff member contributing:  SHEEBA Salinas    Received supervision:  No    Client:  contributed to goals and plan    Did Client receive a copy of treatment plan/revised plan: Yes    Changes to Treatment Plan:  No    Client agrees with plan/revised plan: Yes    Any changes in Vulnerable Adult Status:  No    Substance Use Disorders:  Alcohol Use Disorder Severe (F10.20) and Tobacco Use Disorder Mild (Z72.0)    1) Care Coordination Activities:  Have given client information regarding possible 1:1 therapy within [facility]. See EMR for details.   2) Medical, Mental Health and other appointments the client attended: none  3) Medication issues: none  4) Physical and mental health problems: See EMR  5) Any changes in Vulnerable Adult Status?  No If yes, add to treatment plan and individual abuse prevention plan.  6) Review and evaluation of the individual abuse prevention plan: Current IAPP for this program is adequate for this client    ASA Risk Ratings and Data     DIMENSION 1: Acute Intoxication/Withdrawal  The client's ability to cope with withdrawal symptoms and current state of intoxication     Acute Intoxication/Withdrawal - Current Risk Factor:  0    Reporting sober date of 10/23/2018    Goals:  Remain sober, report use of chemicals to counselor    Data:   No issues. Client passed BA on 12/11.  Intervention: None  Assessment: Client is doing well maintaining sobreity.  Plan:  Continue checking in weekly. Report use of chemicals to counselor.     DIMENSION 2:  Biomedical Conditions and Complaints  The degree to which any physical disorder would interfere with treatment for substance abuse and the client's ability to tolerate any related discomfort     Biomedical Conditions and Complaints - Current Risk Factor:  1    Goals: Remain medically compliant. Start exercise program in IOP CD.    Data:  See EMR. Client reports ongoing dental appointments.   Intervention: Affirmed client in maintaining health appointments.   Assessment: No Issues with exception of no regular exercise program reported.   Plan: Start regular exercise program.     DIMENSION 3:  Emotional/Behavioral/Cognitive Conditions and Complications  The degree to which any condition or complications are likely to interfere with treatment for substance abuse or with function in significant life areas and the likelihood of risk of harm to self or others.     Emotional/Behavioral - Current Risk Factor:  2    DSM-5 Diagnoses:   Reported by client depression/anxiety/possible trauma     Suicide Assessment:  Risk Status    Ideation - Active thoughts of suicide Intent to follow through on suicide Plan for completing suicide    Yes No Yes No Yes No   Emergent  x  x  x   Urgent / Non-Emergent  x  x  x   Non- Urgent  x  x  x   No Current/Active Risk   x  x  x     Goals: Identify 5 coping skills to reduce anxiety.  Practice techniques daily and when needed., List 5 ways your addiction has impacted your mental health.  and establish regular 1:1 appointments with [facility] psychotherapist to address emotional needs, regulation and coping skills.     Data:  No thoughts of harm to self or others 2/13. Is meeting 1:1 with [facility] psychotherapist. See EMR for details. Client expresses no concerns about mental health.  Intervention:  "Affirmed client in ongoing 1:1 therapist appointments.   Assessment: No concerns.  Plan: Continue with 1:1 appointments for at least duration of IOP CD program.     DIMENSION 4:  Readiness to Change  Consider the amount of support and encouragement necessary to keep the client involved in treatment.     Readiness to Change - Current Risk Factor:  1    Goals:  Move up stages of change. Understand the impact your substance use has had on you., Understand the impact your substance use has had on your family and significant relationships. and Increase internal motivation.    Data:  Motivation for sobriety reported 10/10. Motivation for attending sober support 8/10, yet no sober support groups yet.   Intervention: Affirmed client in maintaining motivation for remaining sober. Reviewed \"sobreity vs. Recovery\" and \"dry drunk\" syndrome.   Assessment: Is missing fellowship and regular sober support component of recovery.   Plan: Seek out and attend at least one regular sober support group per week.     DIMENSION 5:  Relapse/Continued Use/Continued Problem Potential  Consider the degree to which the client recognizes relapse issues and has the skills to prevent relapse of either substance use or mental health problems.     Relapse/Continued Use/Continued Problem Potential - Current Risk Factor:  2    Goals:  Develop sober coping and living skills. Identify personal triggers and relapse warning signs., Identify what led to your last relapse. and Identify and understand personal relapse and self-sabotage patterns.    Data:   Client reports no cravings this week. Client reports motivation to attend sober support as 7/10 and motivation for recovery as 10/10. Counselor, client and group discussed pros and cons of attending sober support groups with SO, which is what client is proposing.   Intervention: Talked about options in terms of AA meetings and attending with or without SO.  Assessment: Appears to be willing to seek out and " attend sober support.   Plan:  Report on regular sober support group. Report on status of relationship with SO. Report on being able to handle new job and old job while making time for self care.     DIMENSION 6:  Recovery Environment  Consider the degree to which key areas of the client's life are supportive of or antagonistic to treatment participation and recovery.     Recovery Environment - Current Risk Factor:  3    Support group attended this week:  No    Did family agree to attend family week:  NA    If yes:  none schedule this week    Goals:  Develop a sober support network., Develop boundaries. and Reflect on treatment experience, celebrate accomplishments, anticipate challenges in new sober lifesytle and set goals. Seek out employment that meets financial, family and JASPAL recovery needs.     Data:  Client reports engaged to be . 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   Intervention: Counselor and client agreed that she would move to [facility] Phase III group next week as long as she continues 1:1 with [facility] therapist and seeks out and attends sober support regularly.   Assessment: I am hopeful attendance at sober support will not prove to be a struggle for this client.   Plan: Report on regular sober support attendance.

## 2019-02-18 ENCOUNTER — HOSPITAL ENCOUNTER (OUTPATIENT)
Dept: BEHAVIORAL HEALTH | Facility: CLINIC | Age: 49
End: 2019-02-18
Attending: SOCIAL WORKER
Payer: COMMERCIAL

## 2019-02-18 PROCEDURE — H2035 A/D TX PROGRAM, PER HOUR: HCPCS | Mod: HQ

## 2019-02-18 NOTE — TELEPHONE ENCOUNTER
----- Message from SHEEBA Campo sent at 2/18/2019  3:16 PM CST -----  Regarding: Set up IOP CD Phase III appointments for client at [facility] location  Please arrange to set up 8 IOP CD Phase 3 appointments for the above client at the [facility] location beginning Monday, February 18 at 5:30PM. [id number].  Thanks  SHEEBA Salinas

## 2019-02-18 NOTE — TELEPHONE ENCOUNTER
----- Message from SHEEBA Campo sent at 2/18/2019  3:15 PM CST -----  Regarding: Cancel all future mixed IOP CD Phase I appointments for client at [facility]  Please arrange to cancel all future mixed IOP CD Phase I appointments for the above client at the [facility] location effective immediately. [id number].  Thanks  SHEEBA Salinas

## 2019-02-19 NOTE — PROGRESS NOTES
CD ADULT Progress Note     Treatment Plan Review completed on: Scheduled 2/19    Attendance Dates: 2/18    Total # of Group Sessions:  1    Length of stay/projected discharge date: April 20, 2019 MONDAY TUESDAY WEDNESDAY THURSDAY FRIDAY SATURDAY SUNDAY Total   Group Therapy           Specialty Groups* 2          1:1           Family Program           [place]             Phase II             Absent           Total             *Specialty Groups include Mental Health Care, Assertiveness and Communication, Sobriety Maintenance Skills, Spiritual Care, Stress Management, Relapse Prevention, Family Systems.                  Learning Style:  Demonstration    Staff member contributing:  SHEEBA Salinas    Received supervision: Yes consulted with Crystal PARKER psycho therapist regarding plan for client going forward.     Client:  contributed to goals and plan    Did Client receive a copy of treatment plan/revised plan: Yes    Changes to Treatment Plan:  No    Client agrees with plan/revised plan: Yes    Any changes in Vulnerable Adult Status:  No    Substance Use Disorders:  Alcohol Use Disorder Severe (F10.20) and Tobacco Use Disorder Mild (Z72.0)    1) Care Coordination Activities:  Talked to client about 1:1 therapy within [facility] and plan for future [facility] CD and therapy meetings.. See EMR for details.   2) Medical, Mental Health and other appointments the client attended: Psycho therapy with [facility] therapist. See EMR  3) Medication issues: none  4) Physical and mental health problems: See EMR  5) Any changes in Vulnerable Adult Status?  No If yes, add to treatment plan and individual abuse prevention plan.  6) Review and evaluation of the individual abuse prevention plan: Current IAPP for this program is adequate for this client    ASAM Risk Ratings and Data     DIMENSION 1: Acute Intoxication/Withdrawal  The client's ability to cope with withdrawal symptoms and current state of intoxication     Acute Intoxication/Withdrawal  - Current Risk Factor:  0    Reporting sober date of 10/23/2018    Goals:  Remain sober, report use of chemicals to counselor    Data:  No issues. Client passed BA on 12/11.  Intervention: Affirmed client on 90 days sobriety. Longest period in last four years.   Assessment: Client is doing well maintaining sobriety.  Plan:  Continue checking in weekly. Report use of chemicals to counselor.     DIMENSION 2:  Biomedical Conditions and Complaints  The degree to which any physical disorder would interfere with treatment for substance abuse and the client's ability to tolerate any related discomfort     Biomedical Conditions and Complaints - Current Risk Factor:  1    Goals: Remain medically compliant. Start exercise program in IOP CD.    Data:  See EMR. Client reports ongoing dental appointments.   Intervention: Affirmed client in maintaining health appointments.   Assessment: No Issues with exception of no regular exercise program reported.   Plan: Start regular exercise program.     DIMENSION 3:  Emotional/Behavioral/Cognitive Conditions and Complications  The degree to which any condition or complications are likely to interfere with treatment for substance abuse or with function in significant life areas and the likelihood of risk of harm to self or others.     Emotional/Behavioral - Current Risk Factor:  2    DSM-5 Diagnoses:   Reported by client depression/anxiety/possible trauma     Suicide Assessment:  Risk Status    Ideation - Active thoughts of suicide Intent to follow through on suicide Plan for completing suicide    Yes No Yes No Yes No   Emergent  x  x  x   Urgent / Non-Emergent  x  x  x   Non- Urgent  x  x  x   No Current/Active Risk   x  x  x     Goals: Identify 5 coping skills to reduce anxiety.  Practice techniques daily and when needed., List 5 ways your addiction has impacted your mental health.  and establish regular 1:1 appointments with [facility] psychotherapist to address emotional needs, regulation and  coping skills.     Data:  No thoughts of harm to self or others 2/18. Is meeting 1:1 with [facility] psychotherapist. See EMR for details. Client expresses no concerns about mental health.  Intervention: Affirmed client in ongoing 1:1 therapist appointments.   Assessment: No concerns.  Plan: Continue with 1:1 appointments for at least duration of IOP CD program.     DIMENSION 4:  Readiness to Change  Consider the amount of support and encouragement necessary to keep the client involved in treatment.     Readiness to Change - Current Risk Factor:  0    Goals:  Move up stages of change. Understand the impact your substance use has had on you., Understand the impact your substance use has had on your family and significant relationships. and Increase internal motivation.    Data:  Motivation for sobriety reported 10/10. Motivation for attending sober support 8/10, yet no sober support groups yet.   Intervention: Affirmed client in maintaining motivation for remaining sober. Encouraged client to seek out  And attend sober support group with or without her SO as she had planned last week.   Assessment: Is missing fellowship and regular sober support component of recovery.   Plan: Seek out and attend at least one regular sober support group per week.     DIMENSION 5:  Relapse/Continued Use/Continued Problem Potential  Consider the degree to which the client recognizes relapse issues and has the skills to prevent relapse of either substance use or mental health problems.     Relapse/Continued Use/Continued Problem Potential - Current Risk Factor:  2    Goals:  Develop sober coping and living skills. Identify personal triggers and relapse warning signs., Identify what led to your last relapse. and Identify and understand personal relapse and self-sabotage patterns.    Data:   Client reports no cravings this week. Client reports motivation to attend sober support as 10/10 and motivation for recovery as 10/10.   Intervention: Talked  "about options in terms of AA meetings and attending with or without SO.  Assessment: Appears to be willing to seek out and attend sober support, but needs \"push\" to get there.   Plan:  Report on regular sober support group. Report on status of relationship with SO. Report on being able to handle new job and old job while making time for self care.     DIMENSION 6:  Recovery Environment  Consider the degree to which key areas of the client's life are supportive of or antagonistic to treatment participation and recovery.     Recovery Environment - Current Risk Factor:  3    Support group attended this week:  No    Did family agree to attend family week:  NA    If yes:  none schedule this week    Goals:  Develop a sober support network., Develop boundaries. and Reflect on treatment experience, celebrate accomplishments, anticipate challenges in new sober lifesytle and set goals. Seek out employment that meets financial, family and JASPAL recovery needs.     Data:  Client reports engaged to be . 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 Client reports starting new job this week and inability to keep part time job.   Intervention: Counselor and client agreed that she would continue in IOP CD Phase III group next week as long as she continues 1:1 with [facility] therapist and seeks out and attends sober support regularly. Gave client RCA for future presentation.    Assessment: I am hopeful attendance at sober support will not prove to be a struggle for this client.   Plan: Report on regular sober support attendance. Prepare and present RCA in group. Offer feedback to others on their RCA's.              "

## 2019-02-25 ENCOUNTER — HOSPITAL ENCOUNTER (OUTPATIENT)
Dept: BEHAVIORAL HEALTH | Facility: CLINIC | Age: 49
End: 2019-02-25
Attending: SOCIAL WORKER
Payer: COMMERCIAL

## 2019-02-25 PROCEDURE — H2035 A/D TX PROGRAM, PER HOUR: HCPCS | Mod: HQ

## 2019-02-25 PROCEDURE — H2035 A/D TX PROGRAM, PER HOUR: HCPCS | Performed by: SOCIAL WORKER

## 2019-02-25 NOTE — PROGRESS NOTES
"Progress Note for Individual Session Monday, 2/25/19    Data:  Stephany reported she had her first week at her new job and this was mainly training.  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  Stephany is feeling a sense of \"normalcy\" in her daily life with her fiance and her child and said, \"I have the family I always wanted.\" She said she and her fiance have talked about the boundary issues she discussed last week and his feeling that he needs to protect her and/or solve any issues for her.  She said she is being more mindful \"Of the choices I make and I'm eating better.\"  She forgot to read the material on cognitive distortions but will do that this week and discuss at the next section.  She said she gets \"a bit compulsive\" with cleaning but when she described it, it did not sound like compulsive, but more like a detailed, deep clean. She said this has never impaired her or her relationships in any way, \"It just feels really good to get it all done.\"  She added, \"It's a health routine, like washing the car and taking care of it.\"  She has not been doing her mindfulness or her journal daily and said she will start this again this week.  Her fiance quit drinking with her and he has now been sober for 88 days.      Intervention:  Individual session that included supportive and motivational therapy, problem solving, stress management and daily schedule of structure and routines.     Assessment:  Stephany has made significant progress as she is no longer in a profession that was a trigger for her, she is problem solving and taking time to use mindfulness to give herself time to think something through before her response. She still needs to work on a sober support network outside of the home and she is planning on doing this when she returns from her 2 week " training with the new job. She is not sure when she needs to leave for this.      Plan:   Stephany will continue working on her sober support network, review the cognitive distortions for next session, tentatively set for Monday, 3/11/19, depending on her out-of-town training.  She will start doing a mindfulness practice 5 days a week for 5 minutes in the morning. She will begin doing her nightly check-in (journal) each evening after dinner before she is too tired.

## 2019-02-26 NOTE — PROGRESS NOTES
CD ADULT Progress Note     Treatment Plan Review completed on: Scheduled 2/19    Attendance Dates: 2/26    Total # of Group Sessions:  1    Length of stay/projected discharge date: April 20, 2019 MONDAY TUESDAY WEDNESDAY THURSDAY FRIDAY SATURDAY SUNDAY Total   Group Therapy           Specialty Groups* 2          1:1           Family Program           [place]             Phase II             Absent           Total             *Specialty Groups include Mental Health Care, Assertiveness and Communication, Sobriety Maintenance Skills, Spiritual Care, Stress Management, Relapse Prevention, Family Systems.                  Learning Style:  Demonstration    Staff member contributing:  SHEEBA Salinas    Received supervision: Yes consulted with Crystal PARKER psychotherapshayy regarding plan for client going forward.     Client:  contributed to goals and plan    Did Client receive a copy of treatment plan/revised plan: Yes    Changes to Treatment Plan:  No    Client agrees with plan/revised plan: Yes    Any changes in Vulnerable Adult Status:  No    Substance Use Disorders:  Alcohol Use Disorder Severe (F10.20) and Tobacco Use Disorder Mild (Z72.0)    1) Care Coordination Activities:  None  2) Medical, Mental Health and other appointments the client attended: Psycho therapy with S therapist. See EMR  3) Medication issues: none  4) Physical and mental health problems: See EMR  5) Any changes in Vulnerable Adult Status?  No If yes, add to treatment plan and individual abuse prevention plan.  6) Review and evaluation of the individual abuse prevention plan: Current IAPP for this program is adequate for this client    ASA Risk Ratings and Data     DIMENSION 1: Acute Intoxication/Withdrawal  The client's ability to cope with withdrawal symptoms and current state of intoxication     Acute Intoxication/Withdrawal - Current Risk Factor:  0    Reporting sober date of 10/23/2018    Goals:  Remain sober, report use of chemicals to  counselor    Data:  No issues. Client passed BA on 12/11.  Intervention: Affirmed client on 120 days sobriety. Longest period in last four years.   Assessment: Client is doing well maintaining sobriety.  Plan:  Continue checking in weekly. Report use of chemicals to counselor.     DIMENSION 2:  Biomedical Conditions and Complaints  The degree to which any physical disorder would interfere with treatment for substance abuse and the client's ability to tolerate any related discomfort     Biomedical Conditions and Complaints - Current Risk Factor:  1    Goals: Remain medically compliant. Start exercise program in IOP CD.    Data:  See EMR. Client reports ongoing dental appointments.   Intervention: Affirmed client in maintaining health appointments.   Assessment: No Issues with exception of no regular exercise program reported.   Plan: Start regular exercise program.     DIMENSION 3:  Emotional/Behavioral/Cognitive Conditions and Complications  The degree to which any condition or complications are likely to interfere with treatment for substance abuse or with function in significant life areas and the likelihood of risk of harm to self or others.     Emotional/Behavioral - Current Risk Factor:  2    DSM-5 Diagnoses:   Reported by client depression/anxiety/possible trauma     Suicide Assessment:  Risk Status    Ideation - Active thoughts of suicide Intent to follow through on suicide Plan for completing suicide    Yes No Yes No Yes No   Emergent  x  x  x   Urgent / Non-Emergent  x  x  x   Non- Urgent  x  x  x   No Current/Active Risk   x  x  x     Goals: Identify 5 coping skills to reduce anxiety.  Practice techniques daily and when needed., List 5 ways your addiction has impacted your mental health.  and establish regular 1:1 appointments with [facility] psychotherapist to address emotional needs, regulation and coping skills.     Data:  No thoughts of harm to self or others 2/25. Is meeting 1:1 with [facility] psychotherapist. See  EMR for details. Client expresses no concerns about mental health.  Intervention: Affirmed client in ongoing 1:1 therapist appointments.   Assessment: No concerns. See EMR for therapist assessment.   Plan: Continue with 1:1 appointments for at least duration of IOP CD program.     DIMENSION 4:  Readiness to Change  Consider the amount of support and encouragement necessary to keep the client involved in treatment.     Readiness to Change - Current Risk Factor:  0    Goals:  Move up stages of change. Understand the impact your substance use has had on you., Understand the impact your substance use has had on your family and significant relationships. and Increase internal motivation.    Data:  Motivation for sobriety reported 10/10. Motivation for attending sober support 8/10, yet no sober support groups yet.   Intervention: Affirmed client in maintaining motivation for remaining sober. Encouraged client to seek out  And attend sober support group regularly by the end of Phase III.    Assessment: Is missing fellowship and regular sober support component of recovery.   Plan: Seek out and attend at least one regular sober support group per week.     DIMENSION 5:  Relapse/Continued Use/Continued Problem Potential  Consider the degree to which the client recognizes relapse issues and has the skills to prevent relapse of either substance use or mental health problems.     Relapse/Continued Use/Continued Problem Potential - Current Risk Factor:  2    Goals:  Develop sober coping and living skills. Identify personal triggers and relapse warning signs., Identify what led to your last relapse. and Identify and understand personal relapse and self-sabotage patterns.    Data:   Client reports no cravings this week. Client reports feeling less stress about new job started last week than she anticipated. Client reports it is likely she will not be able to continue with old job on part time basis.    Intervention: Talked about  "options in terms of AA meetings and attending with or without SO.    Assessment: Appears to be willing to seek out and attend sober support, but needs \"push\" to get there.     Plan:  Report on regular sober support group. Report on status of relationship with SO. Report on being able to handle new job and old job while making time for self care.     DIMENSION 6:  Recovery Environment  Consider the degree to which key areas of the client's life are supportive of or antagonistic to treatment participation and recovery.     Recovery Environment - Current Risk Factor:  3    Support group attended this week:  No    Did family agree to attend family week:  NA    If yes:  none schedule this week    Goals:  Develop a sober support network., Develop boundaries. and Reflect on treatment experience, celebrate accomplishments, anticipate challenges in new sober lifesytle and set goals. Seek out employment that meets financial, family and JASPAL recovery needs.     Data:   Client reports getting new job and wanting to maintain existing PT job. Client reports starting new job this week and inability to keep part time job. Client reports having to travel out of state next two weeks for training for new job.   Intervention: Counselor and client agreed that she would continue in IOP CD Phase III group next week as long as she continues 1:1 with [facility] therapist and seeks out and attends sober support regularly. Gave client RCA for future presentation.    Assessment: I am hopeful attendance at sober support will not prove to be a struggle for this client.   Plan: Report on regular sober support attendance. Prepare and present RCA in group. Offer feedback to others on their RCA's. Contact counselor this week to let me know exactly what her travel plans for training will be.              "

## 2019-02-28 NOTE — TELEPHONE ENCOUNTER
Client called to confirm she will travelling for training purposes for her new job for the next two weeks and would miss Phase III group. We discussed this over the last several weeks, so client is excused.   SHEEBA Salinas

## 2019-03-04 ENCOUNTER — HOSPITAL ENCOUNTER (OUTPATIENT)
Dept: BEHAVIORAL HEALTH | Facility: CLINIC | Age: 49
End: 2019-03-04
Attending: SOCIAL WORKER
Payer: COMMERCIAL

## 2019-03-04 PROCEDURE — H2035 A/D TX PROGRAM, PER HOUR: HCPCS | Mod: HQ

## 2019-03-08 NOTE — PROGRESS NOTES
CD ADULT Progress Note     Treatment Plan Review completed on: Scheduled 2/19    Attendance Dates: 3.4.19    Total # of Group Sessions:  1    Length of stay/projected discharge date: April 20, 2019 MONDAY TUESDAY WEDNESDAY THURSDAY FRIDAY SATURDAY SUNDAY Total   Group Therapy           Specialty Groups* 2          1:1           Family Program           [place]             Phase II             Absent           Total             *Specialty Groups include Mental Health Care, Assertiveness and Communication, Sobriety Maintenance Skills, Spiritual Care, Stress Management, Relapse Prevention, Family Systems.                  Learning Style:  Demonstration    Staff member contributing:  Edvin Narayanan MA, [facility]    Received supervision: No    Client:  contributed to goals and plan    Did Client receive a copy of treatment plan/revised plan: Yes    Changes to Treatment Plan:  No    Client agrees with plan/revised plan: Yes    Any changes in Vulnerable Adult Status:  No    Substance Use Disorders:  Alcohol Use Disorder Severe (F10.20) and Tobacco Use Disorder Mild (Z72.0)    1) Care Coordination Activities:  None  2) Medical, Mental Health and other appointments the client attended: Psycho therapy with [facility] therapist. See EMR  3) Medication issues: none  4) Physical and mental health problems: See EMR  5) Any changes in Vulnerable Adult Status?  No If yes, add to treatment plan and individual abuse prevention plan.  6) Review and evaluation of the individual abuse prevention plan: Current IAPP for this program is adequate for this client    ASAM Risk Ratings and Data     DIMENSION 1: Acute Intoxication/Withdrawal  The client's ability to cope with withdrawal symptoms and current state of intoxication     Acute Intoxication/Withdrawal - Current Risk Factor:  0    Reporting sober date of 10/23/2018    Goals:  Remain sober, report use of chemicals to counselor    Data:  No issues. Client passed BA on 12/11.  Intervention: Affirmed  client on 120 days sobriety. Longest period in last four years.   Assessment: Client is doing well maintaining sobriety.  Plan:  Continue checking in weekly. Report use of chemicals to counselor.     DIMENSION 2:  Biomedical Conditions and Complaints  The degree to which any physical disorder would interfere with treatment for substance abuse and the client's ability to tolerate any related discomfort     Biomedical Conditions and Complaints - Current Risk Factor:  1    Goals: Remain medically compliant. Start exercise program in IOP CD.    Data:  See EMR. Client reports ongoing dental appointments.   Intervention: Affirmed client in maintaining health appointments.   Assessment: No Issues with exception of no regular exercise program reported.   Plan: Start regular exercise program.     DIMENSION 3:  Emotional/Behavioral/Cognitive Conditions and Complications  The degree to which any condition or complications are likely to interfere with treatment for substance abuse or with function in significant life areas and the likelihood of risk of harm to self or others.     Emotional/Behavioral - Current Risk Factor:  2    DSM-5 Diagnoses:   Reported by client depression/anxiety/possible trauma     Suicide Assessment:  Risk Status    Ideation - Active thoughts of suicide Intent to follow through on suicide Plan for completing suicide    Yes No Yes No Yes No   Emergent  x  x  x   Urgent / Non-Emergent  x  x  x   Non- Urgent  x  x  x   No Current/Active Risk   x  x  x     Goals: Identify 5 coping skills to reduce anxiety.  Practice techniques daily and when needed., List 5 ways your addiction has impacted your mental health.  and establish regular 1:1 appointments with [facility] psychotherapist to address emotional needs, regulation and coping skills.     Data:  No thoughts of harm to self or others 3/4/19. Is meeting 1:1 with [facility] psychotherapist. See EMR for details. Client expresses no concerns about mental  health.  Intervention: Affirmed client in ongoing 1:1 therapist appointments.   Assessment: No concerns. See EMR for therapist assessment.   Plan: Continue with 1:1 appointments for at least duration of IOP CD program.     DIMENSION 4:  Readiness to Change  Consider the amount of support and encouragement necessary to keep the client involved in treatment.     Readiness to Change - Current Risk Factor:  0    Goals:  Move up stages of change. Understand the impact your substance use has had on you., Understand the impact your substance use has had on your family and significant relationships. and Increase internal motivation.    Data:  Motivation for sobriety reported 10/10. Motivation for attending sober support 8/10, yet no sober support groups yet.   Intervention: Affirmed client in maintaining motivation for remaining sober. Encouraged client to seek out  And attend sober support group regularly by the end of Phase III.    Assessment: Is missing fellowship and regular sober support component of recovery.   Plan: Seek out and attend at least one regular sober support group per week.     DIMENSION 5:  Relapse/Continued Use/Continued Problem Potential  Consider the degree to which the client recognizes relapse issues and has the skills to prevent relapse of either substance use or mental health problems.     Relapse/Continued Use/Continued Problem Potential - Current Risk Factor:  2    Goals:  Develop sober coping and living skills. Identify personal triggers and relapse warning signs., Identify what led to your last relapse. and Identify and understand personal relapse and self-sabotage patterns.    Data:   Client reports no cravings this week. Client reports feeling less stress about new job started last week than she anticipated. Client reports it is likely she will not be able to continue with old job on part time basis.    Intervention: Talked about options in terms of AA meetings and attending with or without  "SO.    Assessment: Appears to be willing to seek out and attend sober support, but needs \"push\" to get there.     Plan:  Report on regular sober support group. Report on status of relationship with SO. Report on being able to handle new job and old job while making time for self care.     DIMENSION 6:  Recovery Environment  Consider the degree to which key areas of the client's life are supportive of or antagonistic to treatment participation and recovery.     Recovery Environment - Current Risk Factor:  3    Support group attended this week:  No    Did family agree to attend family week:  NA    If yes:  none schedule this week    Goals:  Develop a sober support network., Develop boundaries. and Reflect on treatment experience, celebrate accomplishments, anticipate challenges in new sober lifesytle and set goals. Seek out employment that meets financial, family and JASPAL recovery needs.     Data:   Client reports getting new job and taking it full time. She expanded on what she enjoys about the new job. There was no talk of other work in the session. Client did report potential plans to attend a sober meeting however was hesitant on giving level of motivation.   Intervention: Counselor and client agreed that she would continue in IOP CD Phase III group next week as long as she continues 1:1 with [facility] therapist and seeks out and attends sober support regularly. Gave client RCA for future presentation.    Assessment: I am hopeful attendance at sober support will not prove to be a struggle for this client.   Plan: Report on regular sober support attendance. Prepare and present RCA in group. Offer feedback to others on their RCA's. Contact counselor this week to let me know exactly what her travel plans for training will be.              "

## 2019-03-25 ENCOUNTER — HOSPITAL ENCOUNTER (OUTPATIENT)
Dept: BEHAVIORAL HEALTH | Facility: CLINIC | Age: 49
End: 2019-03-25
Attending: SOCIAL WORKER
Payer: COMMERCIAL

## 2019-03-25 PROCEDURE — H2035 A/D TX PROGRAM, PER HOUR: HCPCS | Performed by: SOCIAL WORKER

## 2019-03-25 PROCEDURE — H2035 A/D TX PROGRAM, PER HOUR: HCPCS | Mod: HQ

## 2019-03-26 NOTE — PROGRESS NOTES
Progress Note for Individual Session from 4:08 PM - 5:15 PM   1 hr  D: Stephany came to individual session was upset that her  best friend of 18 years   when on vacation on 3/06/19. She was still visibly grieving and explained that she had used on and off the last 2 weeks after this crisis.  She reported the following use dates and amounts (vodka):  [place] Monday Tuesday Wednesday Thursday Friday Saturday   3/03/19 3/04/19 3/05/19 3/06/19  2 drinks 3/07/19  0 3/08/19  3 drinks  3/09/19  3 drinks   3/10/19   0 3/11/19  0 3/12/19  0 3/13/19  0 3/14/19  0 3/15/19  3 drinks 3/16/19  3-4 drinks   3/17/19   0 3/18/19  2 drinks 3/19/19  0 3/20/19  0 3/21/19  3-4 drinks 3/22/19  0 3/23/10  3 drinks   3/24/19 NEW Sober Date (from 10/23/18)         We processed how she felt up to her first use on 3/06/19 after hearing about her friend s death and the rest of her use. She said,  I felt physically  icky  each morning after I used.   She said,  I don t want that part of me to come back  and  my family is my best support now.   Her SO stayed sober and was supportive to her.  She reviewed coping skills and relapse prevention skills she will begin to use again. She will begin to go to sober support on a regular basis and do mindfulness each day.   I: Supportive and motivational therapy. Review of coping strategies, she rated her depression low but said she was grieving and explored how many changes she had gone through in the last 6 months.  We discussed how she could continue to feel connected to this person as part of the grieving process.    A: Stephany had a setback as detailed above; however, she was honest and open about her use and what she had learned from this experience. She was insightful about the reality of using wasn t  as good  as the anticipation of using. I also reported this use to her primary counselor, Tres Briceno, and the 3 of us met briefly so we could talk about any changes in treatment that may be  appropriate.   P:  Stephany will continue to apply skills she is learning including mindfulness and writing down positive things and what she is grateful for. She will start attending sober support more regularly. She may see Tres more during the week if she feels she may use.  She will go to her family for support, use cognitive defusion for negative thoughts and uncomfortable emotions to lessen their impact. She will also add some form of humor into each day. Her next session with this therapist is scheduled for 4/08/19.

## 2019-03-27 NOTE — PROGRESS NOTES
CD ADULT Progress Note     Treatment Plan Review completed on: 3/25    Attendance Dates: 3/25    Total # of Group Sessions:  1    Length of stay/projected discharge date: End of May, 2019     MONDAY TUESDAY WEDNESDAY THURSDAY FRIDAY SATURDAY AVIVA Total   Group Therapy           Specialty Groups* 2          1:1           Family Program           [place]             Phase II             Absent           Total             *Specialty Groups include Mental Health Care, Assertiveness and Communication, Sobriety Maintenance Skills, Spiritual Care, Stress Management, Relapse Prevention, Family Systems.                  Learning Style:  Demonstration    Staff member contributing:  SHEEBA Salinas    Received supervision: Yes, consulted with psychotherapist     Client:  contributed to goals and plan    Did Client receive a copy of treatment plan/revised plan: Yes    Changes to Treatment Plan:  No    Client agrees with plan/revised plan: Yes    Any changes in Vulnerable Adult Status:  No    Substance Use Disorders:  Alcohol Use Disorder Severe (F10.20) and Tobacco Use Disorder Mild (Z72.0)    1) Care Coordination Activities:  None  2) Medical, Mental Health and other appointments the client attended: Psycho therapy with [facility] therapist. See EMR  3) Medication issues: none  4) Physical and mental health problems: See EMR  5) Any changes in Vulnerable Adult Status?  No If yes, add to treatment plan and individual abuse prevention plan.  6) Review and evaluation of the individual abuse prevention plan: Current IAPP for this program is adequate for this client    ASA Risk Ratings and Data     DIMENSION 1: Acute Intoxication/Withdrawal  The client's ability to cope with withdrawal symptoms and current state of intoxication     Acute Intoxication/Withdrawal - Current Risk Factor:  0    Reporting sober date of 3/24/2019    Goals:  Remain sober, report use of chemicals to counselor    Data:  Client reports drinking alcohol 8 times  since beginning of March.  Client passed BA on 12/11.  Intervention: Set up 1:1 on 4/1 to discuss plan to build back on recovery plan.    Assessment: Client is struggling with sobriety now.   Plan:  Continue checking in weekly. Report use of chemicals to counselor. Come in 4/1 to set up   New plan to build sobriety back.     DIMENSION 2:  Biomedical Conditions and Complaints  The degree to which any physical disorder would interfere with treatment for substance abuse and the client's ability to tolerate any related discomfort     Biomedical Conditions and Complaints - Current Risk Factor:  1    Goals: Remain medically compliant. Start exercise program in IOP CD.    Data:  See EMR. No concerns expressed.   Intervention: Affirmed client in maintaining health appointments.   Assessment: No Issues with exception of no regular exercise program reported.   Plan: Start regular exercise program.     DIMENSION 3:  Emotional/Behavioral/Cognitive Conditions and Complications  The degree to which any condition or complications are likely to interfere with treatment for substance abuse or with function in significant life areas and the likelihood of risk of harm to self or others.     Emotional/Behavioral - Current Risk Factor:  2    DSM-5 Diagnoses:   Reported by client depression/anxiety/possible trauma     Suicide Assessment:  Risk Status    Ideation - Active thoughts of suicide Intent to follow through on suicide Plan for completing suicide    Yes No Yes No Yes No   Emergent  x  x  x   Urgent / Non-Emergent  x  x  x   Non- Urgent  x  x  x   No Current/Active Risk   x  x  x     Goals: Identify 5 coping skills to reduce anxiety.  Practice techniques daily and when needed., List 5 ways your addiction has impacted your mental health.  and establish regular 1:1 appointments with [facility] psychotherapist to address emotional needs, regulation and coping skills.     Data:  No thoughts of harm to self or others 3/25. Is meeting 1:1 with  [facility] psychotherapist. See EMR for details. Client expresses no concerns about mental health.  Intervention: Affirmed client in ongoing 1:1 therapist appointments.    Assessment: Current grief issues due to sudden loss of best friend. See EMR for therapist assessment.   Plan: Continue with 1:1 appointments for at least duration of IOP CD program. May schedule them weekly.     DIMENSION 4:  Readiness to Change  Consider the amount of support and encouragement necessary to keep the client involved in treatment.     Readiness to Change - Current Risk Factor:  0    Goals:  Move up stages of change. Understand the impact your substance use has had on you., Understand the impact your substance use has had on your family and significant relationships. and Increase internal motivation.    Data:  Motivation for sobriety reported 10/10. Motivation for attending sober support 7/10, attended an AA meeting last week.   Intervention: Affirmed client in maintaining motivation for remaining sober. Encouraged client to seek out  and attend sober support group regularly by the end of Phase III. Suggested using sober friend (who also recently relapsed) as mutual support for each other.   Assessment: Is missing fellowship and regular sober support component of recovery.   Plan: Seek out and attend at least one (or more) regular sober support group per week.     DIMENSION 5:  Relapse/Continued Use/Continued Problem Potential  Consider the degree to which the client recognizes relapse issues and has the skills to prevent relapse of either substance use or mental health problems.     Relapse/Continued Use/Continued Problem Potential - Current Risk Factor:  3    Goals:  Develop sober coping and living skills. Identify personal triggers and relapse warning signs., Identify what led to your last relapse. and Identify and understand personal relapse and self-sabotage patterns.    Data:   Client reports big (5/10)  cravings this week. Client  "reports feeling less stress about new job  than she anticipated. Client reports major stressor of losing friend suddenly, coupled with stress of travel to job training as contributing to recent relapse(s).     Intervention: Talked about options in terms of AA meetings and attending with friend.    Assessment: Appears to be willing to seek out and attend sober support, but needs \"push\" to get there.     Plan:  Report on regular sober support group. Report on status of relationship with SO. Report on being able to handle new job and old job while making time for self care. Report on seeking sober women fellowship through AA.     DIMENSION 6:  Recovery Environment  Consider the degree to which key areas of the client's life are supportive of or antagonistic to treatment participation and recovery.     Recovery Environment - Current Risk Factor:  3    Support group attended this week:  No    Did family agree to attend family week:  NA    If yes:  none schedule this week    Goals:  Develop a sober support network., Develop boundaries. and Reflect on treatment experience, celebrate accomplishments, anticipate challenges in new sober lifesytle and set goals. Seek out employment that meets financial, family and JASPAL recovery needs.     Data:   Client reports getting new job and taking it full time. She expanded on what she enjoys about the new job.  Client did report potential plans to attend a sober meeting to build on what she has started.    Intervention: Counselor and client agreed that she would continue in [facility] CD Phase III group next week as long as she continues 1:1 with [facility] therapist and seeks out and attends sober support regularly. Gave client RCA for future presentation.    Assessment: I am hopeful attendance at sober support will not prove to be a struggle for this client.   Plan: Report on regular sober support attendance. Prepare and present RCA in group. Offer feedback to others on their RCA's. Come in early " on 4/1 to discuss enhanced plan for getting recovery back on track.

## 2019-04-01 ENCOUNTER — HOSPITAL ENCOUNTER (OUTPATIENT)
Dept: BEHAVIORAL HEALTH | Facility: CLINIC | Age: 49
End: 2019-04-01
Attending: SOCIAL WORKER
Payer: COMMERCIAL

## 2019-04-01 PROCEDURE — H2035 A/D TX PROGRAM, PER HOUR: HCPCS | Mod: HQ

## 2019-04-04 NOTE — PROGRESS NOTES
CD ADULT Progress Note     Treatment Plan Review completed on: 4/1    Attendance Dates: 4/1    Total # of Group Sessions:  1    Length of stay/projected discharge date: [place], 2019     MONDAY TUESDAY WEDNESDAY THURSDAY FRIDAY SATURDAY AVIVA Total   Group Therapy           Specialty Groups*           1:1           Family Program           [place]             Phase III   2          Absent           Total             *Specialty Groups include Mental Health Care, Assertiveness and Communication, Sobriety Maintenance Skills, Spiritual Care, Stress Management, Relapse Prevention, Family Systems.                  Learning Style:  Demonstration    Staff member contributing:  SHEEBA Salinas    Received supervision: Yes, consulted with psychotherapist     Client:  contributed to goals and plan    Did Client receive a copy of treatment plan/revised plan: Yes    Changes to Treatment Plan:  No    Client agrees with plan/revised plan: Yes    Any changes in Vulnerable Adult Status:  No    Substance Use Disorders:  Alcohol Use Disorder Severe (F10.20) and Tobacco Use Disorder Mild (Z72.0)    1) Care Coordination Activities:  None  2) Medical, Mental Health and other appointments the client attended: Psycho therapy with [facility] therapist. See EMR  3) Medication issues: none  4) Physical and mental health problems: See EMR  5) Any changes in Vulnerable Adult Status?  No If yes, add to treatment plan and individual abuse prevention plan.  6) Review and evaluation of the individual abuse prevention plan: Current IAPP for this program is adequate for this client    ASA Risk Ratings and Data     DIMENSION 1: Acute Intoxication/Withdrawal  The client's ability to cope with withdrawal symptoms and current state of intoxication     Acute Intoxication/Withdrawal - Current Risk Factor:  0    Reporting sober date of 3/24/2019    Goals:  Remain sober, report use of chemicals to counselor    Data:  Client reports drinking alcohol 8  times since beginning of March. Client passed BA on 12/11.  Intervention: Set up 1:1 on 4/1 to discuss plan to build back on recovery plan.    Assessment: Client is struggling with sobriety now.   Plan:  Continue checking in weekly. Report use of chemicals to counselor. Come in 4/1 to set up   New plan to build sobriety back.     DIMENSION 2:  Biomedical Conditions and Complaints  The degree to which any physical disorder would interfere with treatment for substance abuse and the client's ability to tolerate any related discomfort     Biomedical Conditions and Complaints - Current Risk Factor:  1    Goals: Remain medically compliant. Start exercise program in IOP CD.    Data:  See EMR. No concerns expressed.   Intervention: Affirmed client in maintaining health appointments.   Assessment: No Issues with exception of no regular exercise program reported.   Plan: Start regular exercise program.     DIMENSION 3:  Emotional/Behavioral/Cognitive Conditions and Complications  The degree to which any condition or complications are likely to interfere with treatment for substance abuse or with function in significant life areas and the likelihood of risk of harm to self or others.     Emotional/Behavioral - Current Risk Factor:  2    DSM-5 Diagnoses:   Reported by client depression/anxiety/possible trauma     Suicide Assessment:  Risk Status    Ideation - Active thoughts of suicide Intent to follow through on suicide Plan for completing suicide    Yes No Yes No Yes No   Emergent  x  x  x   Urgent / Non-Emergent  x  x  x   Non- Urgent  x  x  x   No Current/Active Risk   x  x  x     Goals: Identify 5 coping skills to reduce anxiety.  Practice techniques daily and when needed., List 5 ways your addiction has impacted your mental health.  and establish regular 1:1 appointments with [facility] psychotherapist to address emotional needs, regulation and coping skills.     Data:  No thoughts of harm to self or others 4/1. Is meeting 1:1  with [facility] psychotherapist. See EMR for details. Client expresses no concerns about mental health.  Intervention: Affirmed client in ongoing 1:1 therapist appointments.    Assessment: Current grief issues due to sudden loss of best friend. See EMR for therapist assessment.   Plan: Continue with 1:1 appointments for at least duration of IOP CD program. May schedule them weekly.     DIMENSION 4:  Readiness to Change  Consider the amount of support and encouragement necessary to keep the client involved in treatment.     Readiness to Change - Current Risk Factor:  0    Goals:  Move up stages of change. Understand the impact your substance use has had on you., Understand the impact your substance use has had on your family and significant relationships. and Increase internal motivation.    Data:  Motivation for sobriety reported 10/10. Motivation for attending sober support 8/10, did not attend an AA meeting last week.   Intervention: Affirmed client in maintaining motivation for remaining sober. Encouraged client to seek out  and attend sober support group regularly by the end of Phase III. Suggested using sober friend (who also recently relapsed) as mutual support for each other.   Assessment: Is missing fellowship and regular sober support component of recovery.   Plan: Seek out and attend at least one (or more) regular sober support group per week.     DIMENSION 5:  Relapse/Continued Use/Continued Problem Potential  Consider the degree to which the client recognizes relapse issues and has the skills to prevent relapse of either substance use or mental health problems.     Relapse/Continued Use/Continued Problem Potential - Current Risk Factor:  3    Goals:  Develop sober coping and living skills. Identify personal triggers and relapse warning signs., Identify what led to your last relapse. and Identify and understand personal relapse and self-sabotage patterns.    Data:   Client reports big (5/10)  cravings this week.  "Client reports feeling less stress about new job  than she anticipated. Client reports major stressor of losing friend suddenly, coupled with stress of travel to job training as contributing to recent relapse(s).     Intervention: Talked about options in terms of AA meetings and attending with friend.    Assessment: Appears to be willing to seek out and attend sober support, but needs \"push\" to get there.     Plan:  Report on regular sober support group. Report on status of relationship with SO. Report on being able to handle new job and old job while making time for self care. Report on seeking sober women fellowship through AA.     DIMENSION 6:  Recovery Environment  Consider the degree to which key areas of the client's life are supportive of or antagonistic to treatment participation and recovery.     Recovery Environment - Current Risk Factor:  3    Support group attended this week:  No    Did family agree to attend family week:  NA    If yes:  none schedule this week    Goals:  Develop a sober support network., Develop boundaries. and Reflect on treatment experience, celebrate accomplishments, anticipate challenges in new sober lifesytle and set goals. Seek out employment that meets financial, family and JASPAL recovery needs.     Data:   Client reports getting new job and taking it full time. She expanded on what she enjoys about the new job.  Client did report potential plans to attend a sober meeting to build on what she has started.    Intervention: Counselor and client agreed that she would continue in [facility] CD Phase III group next week as long as she continues 1:1 with [facility] therapist and seeks out and attends sober support regularly. Gave client RCA for future presentation.    Assessment: I am hopeful attendance at sober support will not prove to be a struggle for this client.   Plan: Report on regular sober support attendance. Prepare and present RCA in group. Offer feedback to others on their RCA's. Come in " early on 4/1 to discuss enhanced plan for getting recovery back on track.

## 2019-04-08 ENCOUNTER — HOSPITAL ENCOUNTER (OUTPATIENT)
Dept: BEHAVIORAL HEALTH | Facility: CLINIC | Age: 49
End: 2019-04-08
Attending: SOCIAL WORKER
Payer: COMMERCIAL

## 2019-04-08 PROCEDURE — H2035 A/D TX PROGRAM, PER HOUR: HCPCS | Mod: HQ

## 2019-04-08 PROCEDURE — H2035 A/D TX PROGRAM, PER HOUR: HCPCS | Performed by: SOCIAL WORKER

## 2019-04-09 NOTE — PROGRESS NOTES
Progress Note for Individual Session 3:35 PM - 4:45 PM, billed 1 hour    D: Milagro was on time and had a brighter affect than last week when she had reported the death of her best friend and her relapse.  Her sober date continues to be 3/24 and she has had some cravings and thoughts of using since her last use.  Stephany said she doesn t miss the taste of the alcohol, just wants to get rid of negative feelings that are surfacing since her relapse.  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 Stephany also spoke of her compulsive traits and wanting to complete an entire project in one day instead of of breaking things down into steps.  She continues to journal and practice mindfulness  a little.      I: This was an individual therapy session that included supportive and motivational therapy, CBT and review of mindfulness. We also problem-solved about her tendency to try to accomplish too much at once which then leads to a mental block followed by procrastination and not  starting the first step of the project.         A: Stephany had a bright affect yet also reported her anxiety at 8 (with 10 high) due to  my bad choice to drink and getting my cravings back  and  I keep letting my mind get the best of me.   She said she was starting to feel  so content, that s a strange feeling for me  right before she relapsed and feels guilty about this self-sabotaging behavior. She is also adjusting to many things over a short period of time. 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 This is emotional for her as well. Stephany is also trying to adjust to not having her close friend to talk to anymore.     P: Milagro agreed to continue working her treatment plan and was given a list of what  she had agreed to start this week:   1. Mindfulness in the morning IF POSSIBLE (5 minutes with deep breathing and sounds)  2. Weekly plan with daily sheets, no more than 10 things  to do  on the weekly sheet.  3. 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

## 2019-04-11 NOTE — PROGRESS NOTES
CD ADULT Progress Note     Treatment Plan Review completed on: 4/8    Attendance Dates: 4/8    Total # of Group Sessions:  1    Length of stay/projected discharge date: [facility] [place], 2019     MONDAY TUESDAY WEDNESDAY THURSDAY FRIDAY SATURDAY AVIVA Total   Group Therapy           Specialty Groups*           1:1           Family Program           [place]             Phase III   2          Absent           Total             *Specialty Groups include Mental Health Care, Assertiveness and Communication, Sobriety Maintenance Skills, Spiritual Care, Stress Management, Relapse Prevention, Family Systems.                  Learning Style:  Demonstration    Staff member contributing:  SHEEBA Salinas    Received supervision: Yes, consulted with psychotherapist     Client:  contributed to goals and plan    Did Client receive a copy of treatment plan/revised plan: Yes    Changes to Treatment Plan:  No    Client agrees with plan/revised plan: Yes    Any changes in Vulnerable Adult Status:  No    Substance Use Disorders:  Alcohol Use Disorder Severe (F10.20) and Tobacco Use Disorder Mild (Z72.0)    1) Care Coordination Activities:  None  2) Medical, Mental Health and other appointments the client attended: Psycho therapy with [facility] therapist. See EMR  3) Medication issues: none  4) Physical and mental health problems: See EMR  5) Any changes in Vulnerable Adult Status?  No If yes, add to treatment plan and individual abuse prevention plan.  6) Review and evaluation of the individual abuse prevention plan: Current IAPP for this program is adequate for this client    ASA Risk Ratings and Data     DIMENSION 1: Acute Intoxication/Withdrawal  The client's ability to cope with withdrawal symptoms and current state of intoxication     Acute Intoxication/Withdrawal - Current Risk Factor:  0    Reporting sober date of 3/24/2019    Goals:  Remain sober, report use of chemicals to counselor    Data:  Client reports drinking alcohol 8  "times since beginning of March. Client passed BA on 12/11.  Intervention: Set up 1:1 on 4/1 to discuss plan to build back on recovery plan.    Assessment: Client is struggling with sobriety now.   Plan:  Continue checking in weekly. Report use of chemicals to counselor. Add attendance at sober support each week.     DIMENSION 2:  Biomedical Conditions and Complaints  The degree to which any physical disorder would interfere with treatment for substance abuse and the client's ability to tolerate any related discomfort     Biomedical Conditions and Complaints - Current Risk Factor:  1    Goals: Remain medically compliant. Start exercise program in IOP CD.    Data:  See EMR. No concerns expressed.   Intervention: Affirmed client in maintaining health appointments. Also pointed out that due to recent  Use episodes of alcohol, while her tolerance hasn't been built back up, the \"pleasure circuitry\" in her   Brain has been kept open with use of alcohol which may explain continuous low level cravings.   Assessment: No Issues with exception of no regular exercise program reported.   Plan: Start regular exercise program.     DIMENSION 3:  Emotional/Behavioral/Cognitive Conditions and Complications  The degree to which any condition or complications are likely to interfere with treatment for substance abuse or with function in significant life areas and the likelihood of risk of harm to self or others.     Emotional/Behavioral - Current Risk Factor:  2    DSM-5 Diagnoses:   Reported by client depression/anxiety/possible trauma     Suicide Assessment:  Risk Status    Ideation - Active thoughts of suicide Intent to follow through on suicide Plan for completing suicide    Yes No Yes No Yes No   Emergent  x  x  x   Urgent / Non-Emergent  x  x  x   Non- Urgent  x  x  x   No Current/Active Risk   x  x  x     Goals: Identify 5 coping skills to reduce anxiety.  Practice techniques daily and when needed., List 5 ways your addiction has " impacted your mental health.  and establish regular 1:1 appointments with [facility] psychotherapist to address emotional needs, regulation and coping skills.     Data:  No thoughts of harm to self or others 4/8. Is meeting 1:1 with [facility] psychotherapist. See EMR for details. Client expresses no concerns about mental health.  Intervention: Affirmed client in ongoing 1:1 therapist appointments.   Assessment: Current grief issues due to sudden loss of best friend. See EMR for therapist assessment.   Plan: Continue with 1:1 appointments for at least duration of IOP CD program. May schedule them weekly.       DIMENSION 4:  Readiness to Change  Consider the amount of support and encouragement necessary to keep the client involved in treatment.     Readiness to Change - Current Risk Factor:  0    Goals:  Move up stages of change. Understand the impact your substance use has had on you., Understand the impact your substance use has had on your family and significant relationships. and Increase internal motivation.    Data:  Motivation for sobriety reported 10/10. Motivation for attending sober support 7/10, did not attend an AA meeting last week.   Intervention: Affirmed client in maintaining motivation for remaining sober. Encouraged client to seek out  and attend sober support group regularly by the end of Phase III. Suggested using sober friend (who also recently relapsed) as mutual support for each other.   Assessment: Is missing fellowship and regular sober support component of recovery.   Plan: Seek out and attend at least one (or more) regular sober support group per week.     DIMENSION 5:  Relapse/Continued Use/Continued Problem Potential  Consider the degree to which the client recognizes relapse issues and has the skills to prevent relapse of either substance use or mental health problems.     Relapse/Continued Use/Continued Problem Potential - Current Risk Factor:  3    Goals:  Develop sober coping and living skills.  "Identify personal triggers and relapse warning signs., Identify what led to your last relapse. and Identify and understand personal relapse and self-sabotage patterns.    Data:   Client reports moderate (4/10)  cravings this week. Client reports feeling less stress about new job  than she anticipated. Client reports major stressor of losing friend suddenly, coupled with stress of travel to job training as contributing to recent relapse(s).     Intervention: Talked about options in terms of AA meetings and attending with friend.    Assessment: Appears to be willing to seek out and attend sober support, but needs \"push\" to get there.     Plan:  Report on regular sober support group. Report on status of relationship with SO. Report on being able to handle new job and old job while making time for self care. Report on seeking sober women fellowship through AA.     DIMENSION 6:  Recovery Environment  Consider the degree to which key areas of the client's life are supportive of or antagonistic to treatment participation and recovery.     Recovery Environment - Current Risk Factor:  3    Support group attended this week:  No    Did family agree to attend family week:  NA    If yes:  none schedule this week    Goals:  Develop a sober support network., Develop boundaries. and Reflect on treatment experience, celebrate accomplishments, anticipate challenges in new sober lifesytle and set goals. Seek out employment that meets financial, family and JASPAL recovery needs.     Data:   Client reports getting new job and taking it full time. She expanded on what she enjoys about the new job.  Client did report potential plans to attend a sober meeting to build on what she has started.    Intervention: Counselor and client agreed that she would continue in IOP CD Phase III group next week as long as she continues 1:1 with [facility] therapist and seeks out and attends sober support regularly. Gave client RCA for future presentation.  "   Assessment: I am hopeful attendance at sober support will not prove to be a struggle for this client.   Plan: Report on regular sober support attendance. Prepare and present RCA in group. Offer feedback to others on their RCA's.

## 2019-04-15 ENCOUNTER — HOSPITAL ENCOUNTER (OUTPATIENT)
Dept: BEHAVIORAL HEALTH | Facility: CLINIC | Age: 49
End: 2019-04-15
Attending: SOCIAL WORKER
Payer: COMMERCIAL

## 2019-04-15 PROCEDURE — H2035 A/D TX PROGRAM, PER HOUR: HCPCS | Mod: HQ

## 2019-04-17 NOTE — PROGRESS NOTES
CD ADULT Progress Note     Treatment Plan Review completed on: 4/15    Attendance Dates: 4/15    Total # of Group Sessions:  1    Length of stay/projected discharge date: [place], 2019     MONDAY TUESDAY WEDNESDAY THURSDAY FRIDAY SATURDAY AVIVA Total   Group Therapy           Specialty Groups*           1:1           Family Program           [place]             Phase III   2          Absent           Total             *Specialty Groups include Mental Health Care, Assertiveness and Communication, Sobriety Maintenance Skills, Spiritual Care, Stress Management, Relapse Prevention, Family Systems.                  Learning Style:  Demonstration    Staff member contributing:  SHEEBA Salinas    Received supervision: Yes, consulted with [facility] psychotherapist     Client:  contributed to goals and plan    Did Client receive a copy of treatment plan/revised plan: Yes    Changes to Treatment Plan:  No    Client agrees with plan/revised plan: Yes    Any changes in Vulnerable Adult Status:  No    Substance Use Disorders:  Alcohol Use Disorder Severe (F10.20) and Tobacco Use Disorder Mild (Z72.0)    1) Care Coordination Activities:  None  2) Medical, Mental Health and other appointments the client attended: Psycho therapy with [facility] therapist. See EMR  3) Medication issues: none  4) Physical and mental health problems: See EMR  5) Any changes in Vulnerable Adult Status?  No If yes, add to treatment plan and individual abuse prevention plan.  6) Review and evaluation of the individual abuse prevention plan: Current IAPP for this program is adequate for this client    [facility] Risk Ratings and Data     DIMENSION 1: Acute Intoxication/Withdrawal  The client's ability to cope with withdrawal symptoms and current state of intoxication     Acute Intoxication/Withdrawal - Current Risk Factor:  0    Reporting sober date of 3/24/2019    Goals:  Remain sober, report use of chemicals to counselor    Data:  Client reports drinking alcohol  "8 times since beginning of March. Client passed BA on 12/11.  Intervention: None  Assessment: Client is struggling with sobriety now.   Plan:  Continue checking in weekly. Report use of chemicals to counselor. Add attendance at sober support each week.     DIMENSION 2:  Biomedical Conditions and Complaints  The degree to which any physical disorder would interfere with treatment for substance abuse and the client's ability to tolerate any related discomfort     Biomedical Conditions and Complaints - Current Risk Factor:  1    Goals: Remain medically compliant. Start exercise program in IOP CD.    Data:  See EMR. Client expressed need to set up eye appointment for possible return of Macular degeneration issues.   Intervention: Affirmed client in maintaining health appointments. Also pointed out that due to recent  Use episodes of alcohol, while her tolerance hasn't been built back up, the \"pleasure circuitry\" in her   Brain has been kept open with use of alcohol which may explain continuous low level cravings.   Assessment: No Issues with exception of no regular exercise program reported.   Plan: Start regular exercise program.     DIMENSION 3:  Emotional/Behavioral/Cognitive Conditions and Complications  The degree to which any condition or complications are likely to interfere with treatment for substance abuse or with function in significant life areas and the likelihood of risk of harm to self or others.     Emotional/Behavioral - Current Risk Factor:  2    DSM-5 Diagnoses:   Reported by client depression/anxiety/possible trauma     Suicide Assessment:  Risk Status    Ideation - Active thoughts of suicide Intent to follow through on suicide Plan for completing suicide    Yes No Yes No Yes No   Emergent  x  x  x   Urgent / Non-Emergent  x  x  x   Non- Urgent  x  x  x   No Current/Active Risk   x  x  x     Goals: Identify 5 coping skills to reduce anxiety.  Practice techniques daily and when needed., List 5 ways " your addiction has impacted your mental health.  and establish regular 1:1 appointments with [facility] psychotherapist to address emotional needs, regulation and coping skills.     Data:  No thoughts of harm to self or others 4/15. Is meeting 1:1 with [facility] psychotherapist. See EMR for details. Client expresses no concerns about mental health.  Intervention: Affirmed client in ongoing 1:1 therapist appointments.   Assessment: Current grief issues due to sudden loss of best friend. See EMR for therapist assessment.   Plan: Continue with 1:1 appointments for at least duration of IOP CD program. May schedule them weekly.     DIMENSION 4:  Readiness to Change  Consider the amount of support and encouragement necessary to keep the client involved in treatment.     Readiness to Change - Current Risk Factor:  0    Goals:  Move up stages of change. Understand the impact your substance use has had on you., Understand the impact your substance use has had on your family and significant relationships. and Increase internal motivation.    Data:  Motivation for sobriety reported 10/10. Motivation for attending sober support 7/10, did not attend an AA meeting last week. Reported meeting with sober friend and discussing challenges in sobriety.   Intervention: Affirmed client in maintaining motivation for remaining sober. Encouraged client to seek out  and attend sober support group regularly by the end of Phase III. Suggested using sober friend (who also recently relapsed) as mutual support for each other.   Assessment: Is missing fellowship and regular sober support component of recovery.   Plan: Seek out and attend at least one (or more) regular sober support group per week. Perhaps use sober friend as way to attend meetings.     DIMENSION 5:  Relapse/Continued Use/Continued Problem Potential  Consider the degree to which the client recognizes relapse issues and has the skills to prevent relapse of either substance use or mental health  "problems.     Relapse/Continued Use/Continued Problem Potential - Current Risk Factor:  3    Goals:  Develop sober coping and living skills. Identify personal triggers and relapse warning signs., Identify what led to your last relapse. and Identify and understand personal relapse and self-sabotage patterns.    Data:   Client reports moderate (3/10)  cravings this week. Client reports feeling less stress about new job  than she anticipated. Client reports major stressor of losing friend suddenly as contributing to recent relapse(s).     Intervention: Talked about options in terms of AA meetings and attending with friend.    Assessment: Appears to be willing to seek out and attend sober support, but needs \"push\" to get there.     Plan:  Report on regular sober support group. Report on status of relationship with SO. Report on being able to handle new job and old job while making time for self care. Report on seeking sober women fellowship through AA.     DIMENSION 6:  Recovery Environment  Consider the degree to which key areas of the client's life are supportive of or antagonistic to treatment participation and recovery.     Recovery Environment - Current Risk Factor:  2    Support group attended this week:  No    Did family agree to attend family week:  NA    If yes:  none schedule this week    Goals:  Develop a sober support network., Develop boundaries. and Reflect on treatment experience, celebrate accomplishments, anticipate challenges in new sober lifesytle and set goals. Seek out employment that meets financial, family and JASPAL recovery needs.     Data:   Client reports getting new job and becoming more comfortable there. She expanded on what she enjoys about the new job.  Client did report potential plans to attend a sober meeting to build on what she has started. Client reported that her friend's sudden death was due to overdose of alcohol and pain meds.   Intervention: Counselor and client agreed that she " would continue in IOP CD Phase III group next week as long as she continues 1:1 with [facility] therapist and seeks out and attends sober support regularly. Gave client RCA for future presentation.    Assessment: I am hopeful attendance at sober support will not prove to be a struggle for this client.   Plan: Report on regular sober support attendance. Prepare and present RCA in group. Offer feedback to others on their RCA's.

## 2019-04-22 ENCOUNTER — HOSPITAL ENCOUNTER (OUTPATIENT)
Dept: BEHAVIORAL HEALTH | Facility: CLINIC | Age: 49
End: 2019-04-22
Attending: SOCIAL WORKER
Payer: COMMERCIAL

## 2019-04-22 PROCEDURE — H2035 A/D TX PROGRAM, PER HOUR: HCPCS | Performed by: SOCIAL WORKER

## 2019-04-22 PROCEDURE — H2035 A/D TX PROGRAM, PER HOUR: HCPCS | Mod: HQ

## 2019-04-22 NOTE — PROGRESS NOTES
Progress Note for Individual Session from 3:30 PM - 4:56 PM     D: Stephany reported  not a lot going on, which is good  and that her stress is 4  mainly from just normal life stuff.  Her sober date is still 3/24 and she is still enjoying the challenge of her new job.  Her SO is also staying sober with and she spoke of having some thoughts of using when she feels stressed or she misses her best friend who  in 3/2018. She said,  It goes through my mind sometimes when I want something that makes not feel.  She continues to do her mindfulness in the morning most days and her journal, however, she was having difficulty finding  3 things that went right today  and we reviewed the difference between  what went right  and  wonderfully positive things  that may not happen each day. 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 She said this reduced her anxiety somewhat but she continues to have ruminating thoughts and will download an charla like  [employer] Timer  to remind her to come to the present each hour or two.    I: This was an individual therapy session that included supportive and motivational therapy, CBT and review of cognitive defusion. We also reviewed the work she did in the last 2 weeks and how she has accomplished her tasks with less feelings of being overwhelmed and less procrastination.  This week we reviewed the skill of cognitive defusion and the importance of bringing herself back to the present moment.           A: Stephany completed all the tasks/assignments she was asked to do by this week with positive outcomes. She said that texting her previous clients and resolving financial stress with her new job has reduced her anxiety and increased her self-confidence somewhat.    Stephany completed 2 screenings as follows:    PHQ-9 = 8 (mild depression); HAFSA-7 = 10 (mild-moderate anxiety) Session focused on ruminating thoughts, cognitive defusion and bringing awareness back to the present moment using 5 senses.     P: Stephany will continue her journal, her mindfulness practice, and her To Do Lists that are limited in amount of tasks each week. She will also download an charla to remind her to come back to the present each hour or two of her waking hours. She will practice cognitive defusion to reduce her ruminating thoughts.  She will meet with me again in 2 weeks and continue her work in her [facility] treatment.

## 2019-04-24 NOTE — PROGRESS NOTES
CD ADULT Progress Note     Treatment Plan Review completed on: 4/22    Attendance Dates: 4/22    Total # of Group Sessions:  1    Length of stay/projected discharge date: [place], 2019     MONDAY TUESDAY WEDNESDAY THURSDAY FRIDAY SATURDAY AVIVA Total   Group Therapy           Specialty Groups*           1:1           Family Program           [employer]             Phase III   2          Absent           Total             *Specialty Groups include Mental Health Care, Assertiveness and Communication, Sobriety Maintenance Skills, Spiritual Care, Stress Management, Relapse Prevention, Family Systems.                  Learning Style:  Demonstration    Staff member contributing:  SHEEBA Salinas    Received supervision: Yes, consulted with [facility] psychotherapist regarding 1:1 therapy     Client:  contributed to goals and plan    Did Client receive a copy of treatment plan/revised plan: Yes    Changes to Treatment Plan:  No    Client agrees with plan/revised plan: Yes    Any changes in Vulnerable Adult Status:  No    Substance Use Disorders:  Alcohol Use Disorder Severe (F10.20) and Tobacco Use Disorder Mild (Z72.0)    1) Care Coordination Activities:  None  2) Medical, Mental Health and other appointments the client attended: Psycho therapy with [facility] therapist. See EMR  3) Medication issues: none  4) Physical and mental health problems: See EMR  5) Any changes in Vulnerable Adult Status?  No If yes, add to treatment plan and individual abuse prevention plan.  6) Review and evaluation of the individual abuse prevention plan: Current IAPP for this program is adequate for this client    ASA Risk Ratings and Data     DIMENSION 1: Acute Intoxication/Withdrawal  The client's ability to cope with withdrawal symptoms and current state of intoxication     Acute Intoxication/Withdrawal - Current Risk Factor:  0    Reporting sober date of 3/24/2019    Goals:  Remain sober, report use of chemicals to counselor    Data:  Client  "reports drinking alcohol 8 times since beginning of March. Client passed BA on 12/11.  Intervention: None  Assessment: Client is struggling less with sobriety now.   Plan:  Continue checking in weekly. Report use of chemicals to counselor. Add attendance at sober support each week. We want at least two months of sobriety prior to discharge.     DIMENSION 2:  Biomedical Conditions and Complaints  The degree to which any physical disorder would interfere with treatment for substance abuse and the client's ability to tolerate any related discomfort     Biomedical Conditions and Complaints - Current Risk Factor:  1    Goals: Remain medically compliant. Start exercise program in IOP CD.    Data:  See EMR. Client expressed need to set up eye appointment for possible return of Macular degeneration issues.   Intervention: Affirmed client in maintaining health appointments. Also pointed out that due to recent  Use episodes of alcohol, while her tolerance hasn't been built back up, the \"pleasure circuitry\" in her   brain has been kept open with use of alcohol which may explain continuous low level cravings.   Assessment: No Issues with exception of no regular exercise program reported.   Plan: Start regular exercise program.     DIMENSION 3:  Emotional/Behavioral/Cognitive Conditions and Complications  The degree to which any condition or complications are likely to interfere with treatment for substance abuse or with function in significant life areas and the likelihood of risk of harm to self or others.     Emotional/Behavioral - Current Risk Factor:  2    DSM-5 Diagnoses:   Reported by client depression/anxiety/possible trauma     Suicide Assessment:  Risk Status    Ideation - Active thoughts of suicide Intent to follow through on suicide Plan for completing suicide    Yes No Yes No Yes No   Emergent  x  x  x   Urgent / Non-Emergent  x  x  x   Non- Urgent  x  x  x   No Current/Active Risk   x  x  x     Goals: Identify 5 " coping skills to reduce anxiety.  Practice techniques daily and when needed., List 5 ways your addiction has impacted your mental health.  and establish regular 1:1 appointments with [facility] psychotherapist to address emotional needs, regulation and coping skills.     Data:  No thoughts of harm to self or others 4/22. Is meeting 1:1 with [facility] psychotherapist. See EMR for details. Client expresses no concerns about mental health.  Intervention: Affirmed client in ongoing 1:1 therapist appointments.   Assessment: Current grief issues due to sudden loss of best friend. See EMR for therapist assessment.   Plan: Continue with 1:1 appointments for at least duration of IOP CD program. May schedule them weekly.     DIMENSION 4:  Readiness to Change  Consider the amount of support and encouragement necessary to keep the client involved in treatment.     Readiness to Change - Current Risk Factor:  0    Goals:  Move up stages of change. Understand the impact your substance use has had on you., Understand the impact your substance use has had on your family and significant relationships. and Increase internal motivation.    Data:  Motivation for sobriety reported 10/10. Motivation for attending sober support 8/10, did not attend an AA meeting last week. Reported meeting with sober friend and discussing challenges in sobriety.   Intervention: Affirmed client in maintaining motivation for remaining sober. Encouraged client to seek out  and attend sober support group regularly by the end of Phase III. Suggested using sober friend (who also recently relapsed) as mutual support for each other.   Assessment: Is missing fellowship and regular sober support component of recovery.   Plan: Seek out and attend at least one (or more) regular sober support group per week. Perhaps use sober friend as way to attend meetings.     DIMENSION 5:  Relapse/Continued Use/Continued Problem Potential  Consider the degree to which the client recognizes  "relapse issues and has the skills to prevent relapse of either substance use or mental health problems.     Relapse/Continued Use/Continued Problem Potential - Current Risk Factor:  3    Goals:  Develop sober coping and living skills. Identify personal triggers and relapse warning signs., Identify what led to your last relapse. and Identify and understand personal relapse and self-sabotage patterns.    Data:   Client reports moderate (3/10)  cravings this week. Client reports feeling less stress about new job  than she anticipated. Client reports major stressor of losing friend suddenly as contributing to recent relapse(s).     Intervention: Talked about options in terms of AA meetings and attending with friend. Also brought up option  of grief groups and how useful sober support fellowship can be with regard to loss.     Assessment: Appears to be willing to seek out and attend sober support, but needs \"push\" to get there.     Plan:  Report on regular sober support group. Report on status of relationship with SO. Report on being able to handle new job and old job while making time for self care. Report on seeking sober women fellowship through AA.     DIMENSION 6:  Recovery Environment  Consider the degree to which key areas of the client's life are supportive of or antagonistic to treatment participation and recovery.     Recovery Environment - Current Risk Factor:  2    Support group attended this week:  No    Did family agree to attend family week:  NA    If yes:  none schedule this week    Goals:  Develop a sober support network., Develop boundaries. and Reflect on treatment experience, celebrate accomplishments, anticipate challenges in new sober lifesytle and set goals. Seek out employment that meets financial, family and JASPAL recovery needs.     Data:   Client reports getting new job and becoming more comfortable there. She expanded on what she enjoys about the new job.  Client did report potential plans to " attend a sober meeting to build on what she has started. Client reported that her friend's sudden death was due to overdose of alcohol and pain meds.   Intervention: Counselor and client agreed that she would continue in IOP CD Phase III group next week as long as she continues 1:1 with [facility] therapist and seeks out and attends sober support regularly. Gave client RCA for future presentation.    Assessment: I am hopeful attendance at sober support will not prove to be a struggle for this client.   Plan: Report on regular sober support attendance. Prepare and present RCA in group. Offer feedback to others on their RCA's.

## 2019-05-07 NOTE — TELEPHONE ENCOUNTER
Left VM for client asking for call back though she did notify us for previous two missed appointments due to conflict. Indicated I just wanted to check in with her regarding how things are going and to call me when she can.  Tres Briceno, SAMIA

## 2023-04-19 ENCOUNTER — TRANSFERRED RECORDS (OUTPATIENT)
Dept: HEALTH INFORMATION MANAGEMENT | Facility: CLINIC | Age: 53
End: 2023-04-19